# Patient Record
Sex: FEMALE | Race: OTHER | Employment: UNEMPLOYED | URBAN - METROPOLITAN AREA
[De-identification: names, ages, dates, MRNs, and addresses within clinical notes are randomized per-mention and may not be internally consistent; named-entity substitution may affect disease eponyms.]

---

## 2024-10-28 ENCOUNTER — INITIAL PRENATAL (OUTPATIENT)
Dept: OBGYN CLINIC | Age: 39
End: 2024-10-28

## 2024-10-28 ENCOUNTER — ROUTINE PRENATAL (OUTPATIENT)
Dept: OBGYN CLINIC | Age: 39
End: 2024-10-28
Payer: MEDICAID

## 2024-10-28 ENCOUNTER — PROCEDURE VISIT (OUTPATIENT)
Dept: OBGYN CLINIC | Age: 39
End: 2024-10-28
Payer: MEDICAID

## 2024-10-28 VITALS — SYSTOLIC BLOOD PRESSURE: 108 MMHG | DIASTOLIC BLOOD PRESSURE: 62 MMHG

## 2024-10-28 VITALS — WEIGHT: 161 LBS | HEIGHT: 67 IN | BODY MASS INDEX: 25.27 KG/M2

## 2024-10-28 DIAGNOSIS — O09.521 MULTIGRAVIDA OF ADVANCED MATERNAL AGE IN FIRST TRIMESTER: ICD-10-CM

## 2024-10-28 DIAGNOSIS — O09.819 TWIN PREGNANCY RESULTING FROM ASSISTED REPRODUCTIVE TECHNOLOGY (ART): ICD-10-CM

## 2024-10-28 DIAGNOSIS — Z11.3 SCREENING FOR STDS (SEXUALLY TRANSMITTED DISEASES): ICD-10-CM

## 2024-10-28 DIAGNOSIS — O36.80X0 ENCOUNTER TO DETERMINE FETAL VIABILITY OF PREGNANCY, SINGLE OR UNSPECIFIED FETUS: ICD-10-CM

## 2024-10-28 DIAGNOSIS — O09.819 TWIN PREGNANCY RESULTING FROM ASSISTED REPRODUCTIVE TECHNOLOGY (ART): Primary | ICD-10-CM

## 2024-10-28 DIAGNOSIS — Z13.89 SCREENING FOR GENITOURINARY CONDITION: Primary | ICD-10-CM

## 2024-10-28 DIAGNOSIS — O30.009 TWIN PREGNANCY RESULTING FROM ASSISTED REPRODUCTIVE TECHNOLOGY (ART): ICD-10-CM

## 2024-10-28 DIAGNOSIS — O30.009 TWIN PREGNANCY RESULTING FROM ASSISTED REPRODUCTIVE TECHNOLOGY (ART): Primary | ICD-10-CM

## 2024-10-28 DIAGNOSIS — Z3A.12 12 WEEKS GESTATION OF PREGNANCY: Primary | ICD-10-CM

## 2024-10-28 DIAGNOSIS — O30.032 MONOCHORIONIC DIAMNIOTIC TWIN GESTATION IN SECOND TRIMESTER: ICD-10-CM

## 2024-10-28 PROBLEM — Z31.89 ENCOUNTER FOR FERTILITY PLANNING: Status: RESOLVED | Noted: 2023-02-27 | Resolved: 2024-10-28

## 2024-10-28 PROBLEM — O00.102 HEMOPERITONEUM DUE TO RUPTURE OF LEFT TUBAL ECTOPIC PREGNANCY: Status: RESOLVED | Noted: 2023-08-06 | Resolved: 2024-10-28

## 2024-10-28 PROBLEM — O09.529 AMA (ADVANCED MATERNAL AGE) MULTIGRAVIDA 35+: Status: ACTIVE | Noted: 2024-10-28

## 2024-10-28 PROBLEM — N76.0 ACUTE VAGINITIS: Status: RESOLVED | Noted: 2024-01-22 | Resolved: 2024-10-28

## 2024-10-28 PROBLEM — R55 SYNCOPE AND COLLAPSE: Status: RESOLVED | Noted: 2023-08-06 | Resolved: 2024-10-28

## 2024-10-28 PROBLEM — K66.1 HEMOPERITONEUM DUE TO RUPTURE OF LEFT TUBAL ECTOPIC PREGNANCY: Status: RESOLVED | Noted: 2023-08-06 | Resolved: 2024-10-28

## 2024-10-28 LAB
ABO + RH BLD: NORMAL
BASOPHILS # BLD: 0 K/UL (ref 0–0.2)
BASOPHILS NFR BLD: 0 % (ref 0–2)
BLOOD GROUP ANTIBODIES SERPL: NORMAL
DIFFERENTIAL METHOD BLD: ABNORMAL
EOSINOPHIL # BLD: 0 K/UL (ref 0–0.8)
EOSINOPHIL NFR BLD: 0 % (ref 0.5–7.8)
ERYTHROCYTE [DISTWIDTH] IN BLOOD BY AUTOMATED COUNT: 12.3 % (ref 11.9–14.6)
HCT VFR BLD AUTO: 35.2 % (ref 35.8–46.3)
HGB BLD-MCNC: 11.8 G/DL (ref 11.7–15.4)
HIV 1+2 AB+HIV1 P24 AG SERPL QL IA: NONREACTIVE
HIV 1/2 RESULT COMMENT: NORMAL
IMM GRANULOCYTES # BLD AUTO: 0.1 K/UL (ref 0–0.5)
IMM GRANULOCYTES NFR BLD AUTO: 0 % (ref 0–5)
LYMPHOCYTES # BLD: 2.8 K/UL (ref 0.5–4.6)
LYMPHOCYTES NFR BLD: 23 % (ref 13–44)
MCH RBC QN AUTO: 34.5 PG (ref 26.1–32.9)
MCHC RBC AUTO-ENTMCNC: 33.5 G/DL (ref 31.4–35)
MCV RBC AUTO: 102.9 FL (ref 82–102)
MONOCYTES # BLD: 0.5 K/UL (ref 0.1–1.3)
MONOCYTES NFR BLD: 4 % (ref 4–12)
NEUTS SEG # BLD: 8.8 K/UL (ref 1.7–8.2)
NEUTS SEG NFR BLD: 73 % (ref 43–78)
NRBC # BLD: 0 K/UL (ref 0–0.2)
PLATELET # BLD AUTO: 281 K/UL (ref 150–450)
PMV BLD AUTO: 11 FL (ref 9.4–12.3)
RBC # BLD AUTO: 3.42 M/UL (ref 4.05–5.2)
RUBV IGG SERPL IA-ACNC: >500 IU/ML
T PALLIDUM AB SER QL IA: NONREACTIVE
WBC # BLD AUTO: 12.2 K/UL (ref 4.3–11.1)

## 2024-10-28 PROCEDURE — 76801 OB US < 14 WKS SINGLE FETUS: CPT | Performed by: OBSTETRICS & GYNECOLOGY

## 2024-10-28 PROCEDURE — 99214 OFFICE O/P EST MOD 30 MIN: CPT | Performed by: OBSTETRICS & GYNECOLOGY

## 2024-10-28 RX ORDER — FOLIC ACID 1 MG/1
1 TABLET ORAL DAILY
COMMUNITY

## 2024-10-28 RX ORDER — ASCORBIC ACID 500 MG
500 TABLET ORAL DAILY
COMMUNITY

## 2024-10-28 RX ORDER — ASPIRIN 81 MG/1
81 TABLET ORAL DAILY
COMMUNITY

## 2024-10-28 NOTE — PROGRESS NOTES
Cigarettes    Smokeless tobacco: Never   Vaping Use    Vaping status: Never Used   Substance and Sexual Activity    Alcohol use: Not Currently    Drug use: Not Currently    Sexual activity: Yes     Partners: Male     Birth control/protection: Surgical     Comment: tubal ligation   Other Topics Concern    Not on file   Social History Narrative    Not on file     Social Determinants of Health     Financial Resource Strain: Not on file   Food Insecurity: Not on file   Transportation Needs: Not on file   Physical Activity: Not on file   Stress: Not on file   Social Connections: Unknown (4/3/2022)    Received from [employer]    Social Connections     Frequency of Communication with Friends and Family: Not asked     Frequency of Social Gatherings with Friends and Family: Not asked   Intimate Partner Violence: Unknown (4/3/2022)    Received from [employer]    Intimate Partner Violence     Fear of Current or Ex-Partner: Not asked     Emotionally Abused: Not asked     Physically Abused: Not asked     Sexually Abused: Not asked   Housing Stability: Not At Risk (4/3/2022)    Received from [employer]    Housing Stability     Was there a time when you did not have a steady place to sleep: Not asked     Worried that the place you are staying is making you sick: Not asked       Family History:  Family History   Problem Relation Age of Onset    Hypertension Father     Hypertension Mother        Ultrasound results: normal with MONO/DI TWINS from single embryo transfer.  VFI x 2    Review of Systems   Constitutional: Negative.   HENT: Negative.   Eyes: Negative.   Respiratory: Negative.   Cardiovascular: Negative.   Gastrointestinal: negative  Genitourinary: Negative.   Musculoskeletal: Negative.   Skin: Negative.   Neurological: Negative.   Endo/Heme/Allergies: Negative.   Psychiatric/Behavioral: Negative.      /62   LMP 07/01/2024     Physical Exam:  Patient is a 39

## 2024-10-28 NOTE — PROGRESS NOTES
Kim HoffmannG6P3 presents today for nob talk.  EDC 25 based off of PREG.  Patient is currently 12 days 3 weeks pregnant mono/di twin pregnancy.  Had IVF with PREG.    Patient education was discussed in depth and OB book reviewed with patient.  [facility] policies reviewed with patient.    Genetic testing discussed in depth with patient.  Patient elects patient had embryo tested through PREG.    Past Medical History:AMA.  Hx of ectopic.  Hx of 1 SAB.  Patient states she had 3 normal pregnancies and deliveries.  Vaginal deliveries in the [place] x 3.     Patient c/o:none    Patient to see Dr. Ying today for nob exam.    All questions answered patient voiced full understanding, consents signed.  Patient encouraged to call with questions or concerns.   service used for this visit.

## 2024-10-29 LAB
C TRACH RRNA SPEC QL NAA+PROBE: NEGATIVE
HBV SURFACE AG SERPL QL IA: NEGATIVE
HCV AB SERPL QL IA: NORMAL
HCV IGG SERPL QL IA: NON REACTIVE S/CO RATIO
N GONORRHOEA RRNA SPEC QL NAA+PROBE: NEGATIVE
SPECIMEN SOURCE: NORMAL
T VAGINALIS RRNA SPEC QL NAA+PROBE: NEGATIVE
VZV IGG SER IA-ACNC: REACTIVE

## 2024-10-30 LAB
BACTERIA SPEC CULT: NORMAL
SERVICE CMNT-IMP: NORMAL

## 2024-10-31 PROBLEM — D58.2 HIGH BLOOD HEMOGLOBIN F (HCC): Status: ACTIVE | Noted: 2024-10-31

## 2024-10-31 LAB — HGB FRACT BLD ELPH-IMP: NORMAL

## 2024-11-12 ENCOUNTER — HOSPITAL ENCOUNTER (EMERGENCY)
Age: 39
Discharge: HOME | End: 2024-11-12
Attending: EMERGENCY MEDICINE
Payer: MEDICAID

## 2024-11-12 VITALS
OXYGEN SATURATION: 100 % | HEART RATE: 80 BPM | TEMPERATURE: 98.2 F | WEIGHT: 158 LBS | HEIGHT: 67 IN | SYSTOLIC BLOOD PRESSURE: 114 MMHG | BODY MASS INDEX: 24.8 KG/M2 | RESPIRATION RATE: 18 BRPM | DIASTOLIC BLOOD PRESSURE: 73 MMHG

## 2024-11-12 DIAGNOSIS — R51.9 ACUTE NONINTRACTABLE HEADACHE, UNSPECIFIED HEADACHE TYPE: Primary | ICD-10-CM

## 2024-11-12 DIAGNOSIS — K13.79 MOUTH PAIN: ICD-10-CM

## 2024-11-12 DIAGNOSIS — Z3A.14 14 WEEKS GESTATION OF PREGNANCY: ICD-10-CM

## 2024-11-12 PROCEDURE — 6370000000 HC RX 637 (ALT 250 FOR IP): Performed by: EMERGENCY MEDICINE

## 2024-11-12 PROCEDURE — 99283 EMERGENCY DEPT VISIT LOW MDM: CPT

## 2024-11-12 RX ORDER — ACETAMINOPHEN 500 MG
1000 TABLET ORAL
Status: COMPLETED | OUTPATIENT
Start: 2024-11-12 | End: 2024-11-12

## 2024-11-12 RX ADMIN — ACETAMINOPHEN 1000 MG: 500 TABLET, FILM COATED ORAL at 18:38

## 2024-11-12 ASSESSMENT — ENCOUNTER SYMPTOMS
NAUSEA: 0
DIARRHEA: 0
EYE PAIN: 0
CONSTIPATION: 0
VOMITING: 0
COUGH: 0
ABDOMINAL PAIN: 0
BACK PAIN: 0
SHORTNESS OF BREATH: 0
RHINORRHEA: 0
BLURRED VISION: 0
COLOR CHANGE: 0
SORE THROAT: 0

## 2024-11-12 ASSESSMENT — PAIN - FUNCTIONAL ASSESSMENT: PAIN_FUNCTIONAL_ASSESSMENT: 0-10

## 2024-11-12 ASSESSMENT — PAIN SCALES - GENERAL
PAINLEVEL_OUTOF10: 8
PAINLEVEL_OUTOF10: 8

## 2024-11-12 ASSESSMENT — PAIN DESCRIPTION - LOCATION: LOCATION: HEAD;MOUTH

## 2024-11-12 NOTE — ED PROVIDER NOTES
Emergency Department Provider Note       PCP: None, None   Age: 39 y.o.   Sex: female     DISPOSITION Decision To Discharge 11/12/2024 06:14:01 PM    ICD-10-CM    1. Acute nonintractable headache, unspecified headache type  R51.9       2. Mouth pain  K13.79       3. 14 weeks gestation of pregnancy  Z3A.14           Medical Decision Making     Patient with mouth pain and discomfort along with diffuse headache.  No blurry vision.  No nausea or vomiting.  She is taking 200 mg of Tylenol at home.  Told her to confirm it is acetaminophen and she can take 1000 mg for the headache.  Follow-up with OB.  No vaginal bleed or discharge.  Continue prenatal vitamins.     1 or more acute illnesses that pose a threat to life or bodily function.   Over the counter drug management performed.  Patient was discharged risks and benefits of hospitalization were considered.  Shared medical decision making was utilized in creating the patients health plan today.  I independently ordered and reviewed each unique test.                         History     Patient reports 6 days of roof of the mouth pain and posterior teeth discomfort.  Feels slightly swollen.  She also has 4 days of posterior headache and diffuse headache all over.  No nausea or vomiting.  She is 14 weeks pregnant.  Doing well.  No vaginal bleed or discharge.  Followed by OB.  Here for evaluation.  States she is taking 200 mg of Tylenol at home.  Confirmed this is not Motrin ibuprofen or Advil.    The history is provided by the patient. A  was used.   Headache  Pain location:  Generalized  Quality:  Dull  Radiates to:  Does not radiate  Duration:  4 days  Timing:  Constant  Progression:  Waxing and waning  Chronicity:  New  Relieved by:  Nothing  Worsened by:  Nothing  Associated symptoms: no abdominal pain, no back pain, no blurred vision, no cough, no diarrhea, no dizziness, no ear pain, no eye pain, no fatigue, no fever, no nausea, no neck pain, no

## 2024-11-12 NOTE — ED NOTES
Patient mobility status  with no difficulty.     I have reviewed discharge instructions with the patient.  The patient verbalized understanding.    Patient left ED via Discharge Method: ambulatory to Home with Extended Family:.    Opportunity for questions and clarification provided.     Patient given 0 scripts.

## 2024-11-12 NOTE — ED TRIAGE NOTES
Pt reports 14 weeks pregnant. . Pt reports having pain in top of mouth also reports feels swollen x 6 days. Pt having posterior headache taking tylenol without improvement. Pt followed by Ancelmo PINEDA.

## 2024-11-25 ENCOUNTER — ROUTINE PRENATAL (OUTPATIENT)
Dept: OBGYN CLINIC | Age: 39
End: 2024-11-25
Payer: MEDICAID

## 2024-11-25 ENCOUNTER — PROCEDURE VISIT (OUTPATIENT)
Dept: OBGYN CLINIC | Age: 39
End: 2024-11-25
Payer: MEDICAID

## 2024-11-25 VITALS — DIASTOLIC BLOOD PRESSURE: 62 MMHG | WEIGHT: 164 LBS | SYSTOLIC BLOOD PRESSURE: 96 MMHG | BODY MASS INDEX: 25.69 KG/M2

## 2024-11-25 DIAGNOSIS — O30.032 MONOCHORIONIC DIAMNIOTIC TWIN GESTATION IN SECOND TRIMESTER: ICD-10-CM

## 2024-11-25 DIAGNOSIS — O30.009 TWIN PREGNANCY RESULTING FROM ASSISTED REPRODUCTIVE TECHNOLOGY (ART): ICD-10-CM

## 2024-11-25 DIAGNOSIS — O09.819 TWIN PREGNANCY RESULTING FROM ASSISTED REPRODUCTIVE TECHNOLOGY (ART): ICD-10-CM

## 2024-11-25 DIAGNOSIS — Z13.89 SCREENING FOR GENITOURINARY CONDITION: ICD-10-CM

## 2024-11-25 DIAGNOSIS — O09.522 MULTIGRAVIDA OF ADVANCED MATERNAL AGE IN SECOND TRIMESTER: Primary | ICD-10-CM

## 2024-11-25 DIAGNOSIS — Z3A.16 16 WEEKS GESTATION OF PREGNANCY: ICD-10-CM

## 2024-11-25 DIAGNOSIS — N89.8 VAGINAL DISCHARGE: ICD-10-CM

## 2024-11-25 DIAGNOSIS — Z11.3 SCREENING EXAMINATION FOR VENEREAL DISEASE: ICD-10-CM

## 2024-11-25 DIAGNOSIS — O30.032 MONOCHORIONIC DIAMNIOTIC TWIN GESTATION IN SECOND TRIMESTER: Primary | ICD-10-CM

## 2024-11-25 LAB
GLUCOSE URINE, POC: NEGATIVE
PROTEIN,URINE, POC: NORMAL

## 2024-11-25 PROCEDURE — 81002 URINALYSIS NONAUTO W/O SCOPE: CPT | Performed by: NURSE PRACTITIONER

## 2024-11-25 PROCEDURE — 76805 OB US >/= 14 WKS SNGL FETUS: CPT | Performed by: STUDENT IN AN ORGANIZED HEALTH CARE EDUCATION/TRAINING PROGRAM

## 2024-11-25 PROCEDURE — 76810 OB US >/= 14 WKS ADDL FETUS: CPT | Performed by: STUDENT IN AN ORGANIZED HEALTH CARE EDUCATION/TRAINING PROGRAM

## 2024-11-25 PROCEDURE — 99213 OFFICE O/P EST LOW 20 MIN: CPT | Performed by: NURSE PRACTITIONER

## 2024-11-25 NOTE — PROGRESS NOTES
SEEN WITH TV . Doing well. FHT for mono-di twins. C/o milky white discharge. Denies odor. Denies itching. Denies burning/frequency with urination. Denies pelvic/abd/flank pain .Denies fevers. Denies new partners. Will send nuswab for further eval, but reassured discharge can increase as pregnancy progresses. MFM on Wednesday for early anatomy. RTC 4 weeks.

## 2024-11-27 ENCOUNTER — ROUTINE PRENATAL (OUTPATIENT)
Dept: OBGYN CLINIC | Age: 39
End: 2024-11-27
Payer: MEDICAID

## 2024-11-27 VITALS — DIASTOLIC BLOOD PRESSURE: 72 MMHG | SYSTOLIC BLOOD PRESSURE: 104 MMHG

## 2024-11-27 DIAGNOSIS — O09.812 HIGH RISK PREGNANCY DUE TO ASSISTED REPRODUCTIVE TECHNOLOGY IN SECOND TRIMESTER: ICD-10-CM

## 2024-11-27 DIAGNOSIS — O09.819 TWIN PREGNANCY RESULTING FROM ASSISTED REPRODUCTIVE TECHNOLOGY (ART): ICD-10-CM

## 2024-11-27 DIAGNOSIS — O09.522 MULTIGRAVIDA OF ADVANCED MATERNAL AGE IN SECOND TRIMESTER: Primary | ICD-10-CM

## 2024-11-27 DIAGNOSIS — Z3A.16 16 WEEKS GESTATION OF PREGNANCY: ICD-10-CM

## 2024-11-27 DIAGNOSIS — O30.009 TWIN PREGNANCY RESULTING FROM ASSISTED REPRODUCTIVE TECHNOLOGY (ART): ICD-10-CM

## 2024-11-27 DIAGNOSIS — O30.032 MONOCHORIONIC DIAMNIOTIC TWIN GESTATION IN SECOND TRIMESTER: ICD-10-CM

## 2024-11-27 PROCEDURE — 76805 OB US >/= 14 WKS SNGL FETUS: CPT | Performed by: OBSTETRICS & GYNECOLOGY

## 2024-11-27 PROCEDURE — 76810 OB US >/= 14 WKS ADDL FETUS: CPT | Performed by: OBSTETRICS & GYNECOLOGY

## 2024-11-27 PROCEDURE — 99204 OFFICE O/P NEW MOD 45 MIN: CPT | Performed by: OBSTETRICS & GYNECOLOGY

## 2024-11-27 RX ORDER — ACETAMINOPHEN 500 MG
500 TABLET ORAL EVERY 6 HOURS PRN
COMMUNITY

## 2024-11-27 ASSESSMENT — PATIENT HEALTH QUESTIONNAIRE - PHQ9
2. FEELING DOWN, DEPRESSED OR HOPELESS: NOT AT ALL
SUM OF ALL RESPONSES TO PHQ QUESTIONS 1-9: 0
1. LITTLE INTEREST OR PLEASURE IN DOING THINGS: NOT AT ALL
SUM OF ALL RESPONSES TO PHQ QUESTIONS 1-9: 0
SUM OF ALL RESPONSES TO PHQ QUESTIONS 1-9: 0
SUM OF ALL RESPONSES TO PHQ9 QUESTIONS 1 & 2: 0
SUM OF ALL RESPONSES TO PHQ QUESTIONS 1-9: 0

## 2024-11-27 NOTE — ASSESSMENT & PLAN NOTE
Advanced Maternal Age (Maternal Age 35 or greater at XMIENA)  First and Second Trimester  The risk of fetal aneuploidy based on maternal age should be reviewed with patient either with physicians or genetic counselor, or both. Testing for fetal aneuploidy can be divided into invasive and non-invasive tests.   Invasive testing, which includes amniocentesis and chorionic villus sampling, is diagnostic.   Non-invasive testing of maternal blood (for either hormone levels or cell-free fetal DNA), with or without ultrasound examination, is a screening test and requires follow-up testing of patients with screen-positive results.  Given the increased risk of congenital anomalies in older women, a detailed second trimester ultrasound examination to assess for significant structural anomalies (particularly cardiac defects) is recommended.      Genetic counseling was performed by physician after reviewing patient's genetic history.  The patient's Down syndrome age associated risk, as well as, risks of additional aneuploidy and genetic syndromes, are reduced by approximately 50% with a normal anatomy ultrasound. Ultrasound alone does not rule out all abnormalities of genetics and development.     Maternal serum screening for aneuploidy was discussed with the patient including first trimester SHEILA-A/hCG, second trimester Quad screen (either in isolation or sequential with SHEILA-A) as well as non-invasive prenatal testing (NIPT) for aneuploidy from a maternal blood sample.  Positive predictive and negative predictive values for these tests were explained, questions answered. Patient understands that these are screening tests that only assesses risk for select abnormalities (trisomies 13, 18, and 21, and sex chromosome abnormalities (NIPT), as well as markers for placental health (SHEILA-A) and risk for open neural tube defects (quad)).  NIPT is designed for high risk populations, but should be considered by all patients who desire the

## 2024-11-27 NOTE — ASSESSMENT & PLAN NOTE
Discussed risks of twin to twin transfusion syndrome/TAPS and need for close surveillance.   Counseling regarding risks of IUGR, preeclampsia.  PTL and cervical insufficiency precautions given.    Low dose Aspirin x 2 tablets (162 mg daily) is recommended to be started at 12-16 weeks; some benefit seen with starting up to 28 weeks.   Recommend serial cervical sonographic evaluation with anatomy and continuing through 24-28 weeks for concerns.   Nutritional Optimization  Encouraged intake of at least  grams of protein per day; will need more if growth lag develops.   Supplementation with PNV daily, Calcium 1000mg daily, 2000IU vitamin D3, slow release iron sulfate should be encouraged beginning by 16 weeks.   Reviewed over the counter medications for treatment of common complaints.   Likely delivery timing reviewed.

## 2024-11-27 NOTE — PATIENT INSTRUCTIONS
Your Maternity Check List   Before Arriving to the Hospital     Find an OBGYN Doctor: [email or web address]  Maternity Pre-registration for Hospital: [email or web address]  Sign up for a Hospital Tour: www.[email or web address]/about-us/classes-events  [place] for Prenatal Classes: www.[facility]UP/about-us/classes-events   Car seat Safety Check: https://www.safekids.org/coalition/safe-kids-[facility]   Learn More: www.[facility]UP/health-care-services/womens-health/maternity   Download the [employer]® Pregnancy Kesha: (Scan QR Code below):  See educational videos, track your pregnancy, and Mom/Baby Care videos

## 2024-11-27 NOTE — PROGRESS NOTES
reviewing previous notes, test results and face to face with the patient discussing the diagnosis and importance of compliance with the treatment plan, as well as documenting on the day of the visit 11/27/2024. Normal ultrasound findings are not included in this time calculation. Full ultrasound data in ultrasound report. Limited ultrasound data included in office consult note.

## 2024-11-28 LAB
A VAGINAE DNA VAG QL NAA+PROBE: ABNORMAL SCORE
BVAB2 DNA VAG QL NAA+PROBE: ABNORMAL SCORE
C ALBICANS DNA VAG QL NAA+PROBE: POSITIVE
C GLABRATA DNA VAG QL NAA+PROBE: NEGATIVE
C TRACH RRNA SPEC QL NAA+PROBE: NEGATIVE
MEGA1 DNA VAG QL NAA+PROBE: ABNORMAL SCORE
N GONORRHOEA RRNA SPEC QL NAA+PROBE: NEGATIVE
SPECIMEN SOURCE: ABNORMAL
T VAGINALIS RRNA SPEC QL NAA+PROBE: NEGATIVE

## 2024-11-29 ENCOUNTER — HOSPITAL ENCOUNTER (OUTPATIENT)
Age: 39
LOS: 1 days | Discharge: HOME | End: 2024-11-29
Attending: OBSTETRICS & GYNECOLOGY | Admitting: OBSTETRICS & GYNECOLOGY
Payer: MEDICAID

## 2024-11-29 VITALS
HEIGHT: 67 IN | WEIGHT: 160 LBS | SYSTOLIC BLOOD PRESSURE: 104 MMHG | HEART RATE: 69 BPM | DIASTOLIC BLOOD PRESSURE: 66 MMHG | BODY MASS INDEX: 25.11 KG/M2 | OXYGEN SATURATION: 99 % | TEMPERATURE: 98.1 F | RESPIRATION RATE: 16 BRPM

## 2024-11-29 PROBLEM — O26.852: Status: ACTIVE | Noted: 2024-11-29

## 2024-11-29 PROCEDURE — 4500000002 HC ER NO CHARGE

## 2024-11-29 PROCEDURE — 99283 EMERGENCY DEPT VISIT LOW MDM: CPT

## 2024-11-29 ASSESSMENT — PAIN - FUNCTIONAL ASSESSMENT: PAIN_FUNCTIONAL_ASSESSMENT: NONE - DENIES PAIN

## 2024-11-30 NOTE — H&P
Obstetrics History & Physical/OB ED note    Name: Kim Hoffmann MRN: [id number]     YOB: 1985  Age: 39 y.o.  Sex: female      Reason for Presentation:  vaginal spotting    HPI: Kim Hoffmann is a 39 y.o.  female with Estimated Date of Delivery: 25 at 17w0d gestation. Her obstetrical history is significant for  3 previous full-term vaginal deliveries.  Prenatal records reviewed. This pregnancy was conceived by IVF and is monochorionic/diamniotic twins.    She is here with complaint of vaginal spotting today. She has vaginal discharge baseline that she has been told is normal. Today she noticed pink color in the discharge with wiping with urination 3 times so far today so came in for evaluation. This is not associated with any pain. No recent intercourse. No problems with urination.    She reports good fetal movement.  She denies leakage of fluid. She denies contractions.     Past History:  OB History    Para Term  AB Living   7 3 3   3 3   SAB IAB Ectopic Molar Multiple Live Births   2 0 1     3      # Outcome Date GA Lbr Roland/2nd Weight Sex Type Anes PTL Lv   7 Current            6 Ectopic 2023           5 Term 09/20/10 40w0d  3.629 kg (8 lb) F Vag-Spont None  CHEL   4 Term 10/09/06 40w0d  3.402 kg (7 lb 8 oz) M Vag-Spont None  CHEL   3 Term 05 39w0d  3.175 kg (7 lb) F Vag-Spont None  CHEL   2 SAB            1 SAB              Past Medical History:   Diagnosis Date    Encounter for assisted reproductive fertility cycle     Infertility, female     male factor-IVF     Past Surgical History:   Procedure Laterality Date    ABDOMINOPLASTY      BREAST REDUCTION SURGERY      LAPAROSCOPY N/A 2023    LAPAROSCOPY DIAGNOSTIC, REMOVAL OF ECTOPIC PREGNANCY, LEFT SALPINGECTOMY performed by Palmer Mcguire MD at [facility] MAIN OR    TUBAL LIGATION       Social History     Tobacco Use    Smoking status: Former     Current packs/day: 0.20     Types: Cigarettes    Smokeless tobacco: Never    Substance Use Topics    Alcohol use: Not Currently     Prior to Admission medications    Medication Sig Start Date End Date Taking? Authorizing Provider   acetaminophen (TYLENOL) 500 MG tablet Take 1 tablet by mouth every 6 hours as needed for Pain   Yes Sen Ventura MD   Prenatal MV-Min-Fe Fum-FA-DHA (PRENATAL+DHA PO) Take by mouth   Yes Sen Ventura MD   vitamin C (ASCORBIC ACID) 500 MG tablet Take 1 tablet by mouth daily   Yes Sen Ventura MD   aspirin 81 MG EC tablet Take 1 tablet by mouth daily   Yes Sen Ventura MD   folic acid (FOLVITE) 1 MG tablet Take 1 tablet by mouth daily   Yes Sen Ventura MD     No Known Allergies    Physical Exam:  VITALS:  /66   Pulse 69   Temp 98.1 °F (36.7 °C) (Oral)   Resp 16   Ht 1.702 m (5' 7\")   Wt 72.6 kg (160 lb)   LMP 07/01/2024   SpO2 99%   BMI 25.06 kg/m²     CONSTITUTIONAL:  awake, alert, cooperative, no apparent distress, and appears stated age  ABDOMEN:  non-tender  FHTs:  Appropriate FHTs x2 on bedside ultrasound ;  Uterine activity/Contractions on monitor: None  Membranes: intact  Cervix: closed/thick/high  Presentation: breech x2 on bedside ultrasound  No visible placental abruption on bedside ultrasound      Assessment:  17w0d gestation with spotting in normal discharge  No evidence of labor or abruption ;   Reassuring fetal status    Plan: Discharge home, follow-up at next OB appointment, which is on 12/11/24  Advised her to follow up this coming week if bleeding became heavier

## 2024-11-30 NOTE — ED TRIAGE NOTES
Patient presents to the ER for vaginal bleeding that started today at around 1800. Patient is 17 weeks pregnant with a confirmed pregnancy by ultrasound. Patient has had light red bleeding.

## 2024-12-11 ENCOUNTER — ROUTINE PRENATAL (OUTPATIENT)
Dept: OBGYN CLINIC | Age: 39
End: 2024-12-11
Payer: MEDICAID

## 2024-12-11 VITALS — DIASTOLIC BLOOD PRESSURE: 62 MMHG | SYSTOLIC BLOOD PRESSURE: 95 MMHG

## 2024-12-11 DIAGNOSIS — O30.032 MONOCHORIONIC DIAMNIOTIC TWIN GESTATION IN SECOND TRIMESTER: ICD-10-CM

## 2024-12-11 DIAGNOSIS — O09.522 MULTIGRAVIDA OF ADVANCED MATERNAL AGE IN SECOND TRIMESTER: ICD-10-CM

## 2024-12-11 DIAGNOSIS — Z3A.18 18 WEEKS GESTATION OF PREGNANCY: ICD-10-CM

## 2024-12-11 DIAGNOSIS — O09.812 HIGH RISK PREGNANCY DUE TO ASSISTED REPRODUCTIVE TECHNOLOGY IN SECOND TRIMESTER: Primary | ICD-10-CM

## 2024-12-11 PROBLEM — O26.852: Status: RESOLVED | Noted: 2024-11-29 | Resolved: 2024-12-11

## 2024-12-11 PROCEDURE — 99214 OFFICE O/P EST MOD 30 MIN: CPT | Performed by: OBSTETRICS & GYNECOLOGY

## 2024-12-11 PROCEDURE — 76816 OB US FOLLOW-UP PER FETUS: CPT | Performed by: OBSTETRICS & GYNECOLOGY

## 2024-12-11 PROCEDURE — 76821 MIDDLE CEREBRAL ARTERY ECHO: CPT | Performed by: OBSTETRICS & GYNECOLOGY

## 2024-12-11 PROCEDURE — 76820 UMBILICAL ARTERY ECHO: CPT | Performed by: OBSTETRICS & GYNECOLOGY

## 2024-12-11 ASSESSMENT — PATIENT HEALTH QUESTIONNAIRE - PHQ9
2. FEELING DOWN, DEPRESSED OR HOPELESS: NOT AT ALL
SUM OF ALL RESPONSES TO PHQ QUESTIONS 1-9: 0
SUM OF ALL RESPONSES TO PHQ9 QUESTIONS 1 & 2: 0
SUM OF ALL RESPONSES TO PHQ QUESTIONS 1-9: 0
SUM OF ALL RESPONSES TO PHQ QUESTIONS 1-9: 0
1. LITTLE INTEREST OR PLEASURE IN DOING THINGS: NOT AT ALL
SUM OF ALL RESPONSES TO PHQ QUESTIONS 1-9: 0

## 2024-12-11 NOTE — PROGRESS NOTES
ASSESSMENT/PLAN:    Patient Active Problem List    Diagnosis Date Noted    Monochorionic diamniotic twin gestation in second trimester 11/27/2024     Priority: High     Overview Note:     11/27/24 [facility]: MonoDi twinsMs. Normal early anatomy x2. No signs of TTTS. Embryo had PGT testing. Genetic counseling done by MD.  For full details review formal ultrasound report.   12/11/24 [facility]: Reassuring fetal status x 2. Twin A: AC- 76%, MVP- 4.3 cm. Twin B: AC- 91%, MVP- 3.8 cm. CL- 4.5 cm. No sign of TTTS. For full details review formal ultrasound report.          Assessment & Plan Note:     F/U [facility] 2 weeks      Multigravida of advanced maternal age in second trimester 10/28/2024     Priority: High     Overview Note:     Referral to [facility] placed 10/28/24.  Genetics: PGT-A normal; no NIPT  GTT:  Flu:  Tdap:      High risk pregnancy due to assisted reproductive technology in second trimester 10/28/2024     Priority: Medium     Overview Note:     PREG patient due to male factor.  Single embryo transferred; frozen. PGT-A normal; declined NIPT  Di/Rhea  Referral to [facility].  PREG patient.  HX BTL.      High blood hemoglobin F (HCC) 10/31/2024     Overview Note:     Not clinically significant          Alida Wheatley MD   An electronic signature was used to authenticate this note.  Return in about 2 weeks (around 12/25/2024) for TTTS.     I have spent 31 minutes reviewing previous notes, test results and face to face with the patient discussing the diagnosis and importance of compliance with the treatment plan, as well as documenting on the day of the visit 12/11/2024. Normal ultrasound findings are not included in this time calculation. Full ultrasound data in ultrasound report. Limited ultrasound data included in office consult note.

## 2024-12-11 NOTE — PATIENT INSTRUCTIONS
Your Maternity Check List   Before Arriving to the Hospital     Find an OBGYN Doctor: [email or web address]  Maternity Pre-registration for Hospital: [email or web address]  Sign up for a Hospital Tour: www.komoot/about-us/classes-events  [place] for Prenatal Classes: www.komoot/about-us/classes-events   Car seat Safety Check: https://www.[email or web address]/coalition/safe-kids-[facility]   Learn More: www.komoot/health-care-services/womens-health/maternity   Download the [employer] Pregnancy Kesha: (Scan QR Code below):  See educational videos, track your pregnancy, and Mom/Baby Care videos          Resources for Depression/Anxiety  Postpartum Support International (PSI).    PSI Warmline:  5-546-818-4PPD (3836).  WWW.POSTPARTUM.NET    Mom's IMPACTT  [email or web address]://[email or web address].org/medical-services/womens/reproductive-behavioral-health/moms-impactt       In order to optimize maternal, fetal, and  health, we recommend the following vaccinations.   Flu- yearly (https://www.highriskpregnancyinfo.org/flu-facts-for-pregnancy)  Consider Covid vaccination/booster (https://www.highriskpregnancyinfo.org/covid-19-pregnancy)  TDaP after 28 weeks each pregnancy (https://www.highriskpregnancyinfo.org/tdap)  Consider RSV vaccine 32-36 weeks of pregnancy, if Sept- January.  (https://www.highriskpregnancyinfo.org/rsv)

## 2024-12-20 ENCOUNTER — ROUTINE PRENATAL (OUTPATIENT)
Dept: OBGYN CLINIC | Age: 39
End: 2024-12-20

## 2024-12-20 VITALS — BODY MASS INDEX: 26.83 KG/M2 | DIASTOLIC BLOOD PRESSURE: 76 MMHG | SYSTOLIC BLOOD PRESSURE: 118 MMHG | WEIGHT: 171.3 LBS

## 2024-12-20 DIAGNOSIS — Z3A.20 20 WEEKS GESTATION OF PREGNANCY: ICD-10-CM

## 2024-12-20 DIAGNOSIS — Z13.89 SCREENING FOR GENITOURINARY CONDITION: ICD-10-CM

## 2024-12-20 DIAGNOSIS — Z34.92 PRENATAL CARE, SECOND TRIMESTER: Primary | ICD-10-CM

## 2024-12-20 LAB
GLUCOSE URINE, POC: NEGATIVE
GLUCOSE URINE, POC: NEGATIVE
PROTEIN,URINE, POC: NORMAL
PROTEIN,URINE, POC: NORMAL

## 2024-12-20 NOTE — PROGRESS NOTES
Seeing mfm .  Ptl precautions. Rtc 4 weeks.  Taking asa/ vit c /pnv.  Added vit d.  Discussed movement.  Will be . Hx of abdominoplasty.

## 2024-12-24 ENCOUNTER — ROUTINE PRENATAL (OUTPATIENT)
Dept: OBGYN CLINIC | Age: 39
End: 2024-12-24
Payer: MEDICAID

## 2024-12-24 VITALS — SYSTOLIC BLOOD PRESSURE: 109 MMHG | DIASTOLIC BLOOD PRESSURE: 63 MMHG

## 2024-12-24 DIAGNOSIS — D58.2 HIGH BLOOD HEMOGLOBIN F (HCC): ICD-10-CM

## 2024-12-24 DIAGNOSIS — Z3A.20 20 WEEKS GESTATION OF PREGNANCY: ICD-10-CM

## 2024-12-24 DIAGNOSIS — O43.192 MARGINAL INSERTION OF UMBILICAL CORD AFFECTING MANAGEMENT OF MOTHER IN SECOND TRIMESTER: ICD-10-CM

## 2024-12-24 DIAGNOSIS — O30.032 MONOCHORIONIC DIAMNIOTIC TWIN GESTATION IN SECOND TRIMESTER: ICD-10-CM

## 2024-12-24 DIAGNOSIS — O09.812 HIGH RISK PREGNANCY DUE TO ASSISTED REPRODUCTIVE TECHNOLOGY IN SECOND TRIMESTER: ICD-10-CM

## 2024-12-24 DIAGNOSIS — O09.522 MULTIGRAVIDA OF ADVANCED MATERNAL AGE IN SECOND TRIMESTER: Primary | ICD-10-CM

## 2024-12-24 PROCEDURE — 76812 OB US DETAILED ADDL FETUS: CPT | Performed by: OBSTETRICS & GYNECOLOGY

## 2024-12-24 PROCEDURE — 76811 OB US DETAILED SNGL FETUS: CPT | Performed by: OBSTETRICS & GYNECOLOGY

## 2024-12-24 PROCEDURE — 99214 OFFICE O/P EST MOD 30 MIN: CPT | Performed by: OBSTETRICS & GYNECOLOGY

## 2024-12-24 PROCEDURE — 93325 DOPPLER ECHO COLOR FLOW MAPG: CPT | Performed by: OBSTETRICS & GYNECOLOGY

## 2024-12-24 PROCEDURE — 76827 ECHO EXAM OF FETAL HEART: CPT | Performed by: OBSTETRICS & GYNECOLOGY

## 2024-12-24 PROCEDURE — 76825 ECHO EXAM OF FETAL HEART: CPT | Performed by: OBSTETRICS & GYNECOLOGY

## 2024-12-24 ASSESSMENT — PATIENT HEALTH QUESTIONNAIRE - PHQ9
SUM OF ALL RESPONSES TO PHQ9 QUESTIONS 1 & 2: 0
SUM OF ALL RESPONSES TO PHQ QUESTIONS 1-9: 0
SUM OF ALL RESPONSES TO PHQ QUESTIONS 1-9: 0
1. LITTLE INTEREST OR PLEASURE IN DOING THINGS: NOT AT ALL
SUM OF ALL RESPONSES TO PHQ QUESTIONS 1-9: 0
SUM OF ALL RESPONSES TO PHQ QUESTIONS 1-9: 0
2. FEELING DOWN, DEPRESSED OR HOPELESS: NOT AT ALL

## 2024-12-24 NOTE — PROGRESS NOTES
day of the visit 12/24/2024. Normal ultrasound findings are not included in this time calculation. Full ultrasound data in ultrasound report. Limited ultrasound data included in office consult note.

## 2025-01-07 ENCOUNTER — ROUTINE PRENATAL (OUTPATIENT)
Dept: OBGYN CLINIC | Age: 40
End: 2025-01-07
Payer: MEDICAID

## 2025-01-07 VITALS — SYSTOLIC BLOOD PRESSURE: 105 MMHG | DIASTOLIC BLOOD PRESSURE: 71 MMHG

## 2025-01-07 DIAGNOSIS — O09.522 MULTIGRAVIDA OF ADVANCED MATERNAL AGE IN SECOND TRIMESTER: ICD-10-CM

## 2025-01-07 DIAGNOSIS — O43.192 MARGINAL INSERTION OF UMBILICAL CORD AFFECTING MANAGEMENT OF MOTHER IN SECOND TRIMESTER: ICD-10-CM

## 2025-01-07 DIAGNOSIS — O30.032 MONOCHORIONIC DIAMNIOTIC TWIN GESTATION IN SECOND TRIMESTER: Primary | ICD-10-CM

## 2025-01-07 DIAGNOSIS — O09.812 HIGH RISK PREGNANCY DUE TO ASSISTED REPRODUCTIVE TECHNOLOGY IN SECOND TRIMESTER: ICD-10-CM

## 2025-01-07 DIAGNOSIS — Z3A.22 22 WEEKS GESTATION OF PREGNANCY: ICD-10-CM

## 2025-01-07 PROCEDURE — 76821 MIDDLE CEREBRAL ARTERY ECHO: CPT | Performed by: OBSTETRICS & GYNECOLOGY

## 2025-01-07 PROCEDURE — 76816 OB US FOLLOW-UP PER FETUS: CPT | Performed by: OBSTETRICS & GYNECOLOGY

## 2025-01-07 PROCEDURE — 76820 UMBILICAL ARTERY ECHO: CPT | Performed by: OBSTETRICS & GYNECOLOGY

## 2025-01-07 PROCEDURE — 99214 OFFICE O/P EST MOD 30 MIN: CPT | Performed by: OBSTETRICS & GYNECOLOGY

## 2025-01-07 RX ORDER — FAMOTIDINE 20 MG/1
20 TABLET, FILM COATED ORAL 2 TIMES DAILY
COMMUNITY

## 2025-01-07 ASSESSMENT — PATIENT HEALTH QUESTIONNAIRE - PHQ9
SUM OF ALL RESPONSES TO PHQ QUESTIONS 1-9: 0
SUM OF ALL RESPONSES TO PHQ9 QUESTIONS 1 & 2: 0
SUM OF ALL RESPONSES TO PHQ QUESTIONS 1-9: 0
2. FEELING DOWN, DEPRESSED OR HOPELESS: NOT AT ALL
SUM OF ALL RESPONSES TO PHQ QUESTIONS 1-9: 0
1. LITTLE INTEREST OR PLEASURE IN DOING THINGS: NOT AT ALL
SUM OF ALL RESPONSES TO PHQ QUESTIONS 1-9: 0

## 2025-01-07 NOTE — PATIENT INSTRUCTIONS
Your Maternity Check List   Before Arriving to the Hospital     Find an OBGYN Doctor: [email or web address]  Maternity Pre-registration for Hospital: [email or web address]  Sign up for a Hospital Tour: www.MovieLaLa/about-us/classes-events  [place] for Prenatal Classes: www.MovieLaLa/about-us/classes-events   Car seat Safety Check: https://www.myThings.org/coalition/safe-kids-[facility]   Learn More: www.[employer]LaLa/health-care-services/womens-health/maternity   Download the [employer] Pregnancy Kesha: (Scan QR Code below):  See educational videos, track your pregnancy, and Mom/Baby Care videos          Resources for Depression/Anxiety  Postpartum Support International (PSI).    PSI Warmline:  5-316-599-4PPD (8027).  WWW.POSTPARTUM.NET    Mom's IMPACTT  https://[email or web address].org/medical-services/womens/reproductive-behavioral-health/moms-impactt       In order to optimize maternal, fetal, and  health, we recommend the following vaccinations.   Flu- yearly (https://www.highriskpregnancyinfo.org/flu-facts-for-pregnancy)  Consider Covid vaccination/booster (https://www.highriskpregnancyinfo.org/covid-19-pregnancy)  TDaP after 28 weeks each pregnancy (https://www.highriskpregnancyinfo.org/tdap)  Consider RSV vaccine 32-36 weeks of pregnancy, if Sept- January.  (https://www.highriskpregnancyinfo.org/rsv)

## 2025-01-07 NOTE — PROGRESS NOTES
[facility] MATERNAL FETAL MEDICINE    [address] [place] [address]  P- [phone]  [phone]     MFM Follow-up Visit  Kim Hoffmann (1985) is a 39 y.o.  at 22w4d with 2025, by Other Basis.   Presents for evaluation of the following chief complaint(s):   Chief Complaint   Patient presents with    High Risk Pregnancy     AMA, Twins, IVF      w/ HIT Services present for entire visit.     Patient is a SAHM. Expecting 2 girls, Joy and Martita.   Patient is scheduled to see Primary OB ([facility]) on 25.  Support person/friend present today.     Interval history since prior appt reviewed and chart updated as indicated.    No HAs, edema.  Denies preeclamptic symptoms.  Reports good fetal movement.  No bleeding, LOF, cramping, ctxs, or vaginal pressure.        Mood evaluated today based on discussion with pt and PHQ screen.       2025    11:12 AM   PHQ-9    Little interest or pleasure in doing things 0   Feeling down, depressed, or hopeless 0   PHQ-2 Score 0   PHQ-9 Total Score 0      Mood Reassuring today  Recommend lifestyle/behavioral modifications to enhance mood (exercise, sunshine, mood apps, nutritional modifications, etc).     Reviewed gestational age precautions and activity goals/limitations  Nutritional counseling as well as specific goals based on current maternal and fetal status    Addressed normal pregnancy complaints, reassured and offered suggestions for care  Options for GERD, constipation, other common complaints reviewed    Reviewed gestational age appropriate preventive care regarding communicable disease transmission and vaccines as appropriate (including flu, TDaP >28wk, RSV 32-36wk (-), as well as, COVID.)  All questions answered and concerns discussed. Additional recommendations in problem list.     Exam:     Vitals:    25 1115   BP: 105/71      Physical Exam  Applicable labs reviewed.   Please see formal ultrasound

## 2025-01-17 ENCOUNTER — ROUTINE PRENATAL (OUTPATIENT)
Dept: OBGYN CLINIC | Age: 40
End: 2025-01-17

## 2025-01-17 VITALS — BODY MASS INDEX: 28.08 KG/M2 | WEIGHT: 179.3 LBS | SYSTOLIC BLOOD PRESSURE: 112 MMHG | DIASTOLIC BLOOD PRESSURE: 70 MMHG

## 2025-01-17 DIAGNOSIS — Z34.92 PRENATAL CARE, SECOND TRIMESTER: Primary | ICD-10-CM

## 2025-01-17 DIAGNOSIS — Z3A.24 24 WEEKS GESTATION OF PREGNANCY: ICD-10-CM

## 2025-01-17 DIAGNOSIS — Z13.89 SCREENING FOR GENITOURINARY CONDITION: ICD-10-CM

## 2025-01-17 LAB
GLUCOSE URINE, POC: NEGATIVE
PROTEIN,URINE, POC: NEGATIVE

## 2025-01-21 ENCOUNTER — ROUTINE PRENATAL (OUTPATIENT)
Dept: OBGYN CLINIC | Age: 40
End: 2025-01-21
Payer: MEDICAID

## 2025-01-21 VITALS — SYSTOLIC BLOOD PRESSURE: 108 MMHG | DIASTOLIC BLOOD PRESSURE: 74 MMHG

## 2025-01-21 DIAGNOSIS — O30.032 MONOCHORIONIC DIAMNIOTIC TWIN GESTATION IN SECOND TRIMESTER: ICD-10-CM

## 2025-01-21 DIAGNOSIS — O09.522 MULTIGRAVIDA OF ADVANCED MATERNAL AGE IN SECOND TRIMESTER: ICD-10-CM

## 2025-01-21 DIAGNOSIS — O43.192 MARGINAL INSERTION OF UMBILICAL CORD AFFECTING MANAGEMENT OF MOTHER IN SECOND TRIMESTER: ICD-10-CM

## 2025-01-21 DIAGNOSIS — O09.812 HIGH RISK PREGNANCY DUE TO ASSISTED REPRODUCTIVE TECHNOLOGY IN SECOND TRIMESTER: Primary | ICD-10-CM

## 2025-01-21 DIAGNOSIS — Z3A.24 24 WEEKS GESTATION OF PREGNANCY: ICD-10-CM

## 2025-01-21 PROCEDURE — 76820 UMBILICAL ARTERY ECHO: CPT | Performed by: OBSTETRICS & GYNECOLOGY

## 2025-01-21 PROCEDURE — 76816 OB US FOLLOW-UP PER FETUS: CPT | Performed by: OBSTETRICS & GYNECOLOGY

## 2025-01-21 PROCEDURE — 99214 OFFICE O/P EST MOD 30 MIN: CPT | Performed by: OBSTETRICS & GYNECOLOGY

## 2025-01-21 PROCEDURE — 76821 MIDDLE CEREBRAL ARTERY ECHO: CPT | Performed by: OBSTETRICS & GYNECOLOGY

## 2025-01-21 ASSESSMENT — PATIENT HEALTH QUESTIONNAIRE - PHQ9
SUM OF ALL RESPONSES TO PHQ QUESTIONS 1-9: 0
2. FEELING DOWN, DEPRESSED OR HOPELESS: NOT AT ALL
SUM OF ALL RESPONSES TO PHQ QUESTIONS 1-9: 0
1. LITTLE INTEREST OR PLEASURE IN DOING THINGS: NOT AT ALL
SUM OF ALL RESPONSES TO PHQ9 QUESTIONS 1 & 2: 0

## 2025-01-21 NOTE — PROGRESS NOTES
[facility] MATERNAL FETAL MEDICINE    [address] [place] [address]  P- [phone]  [phone]     MFM Follow-up Visit  Kim Hoffmann (1985) is a 39 y.o.  at 24w4d with 2025, by Other Basis.   Presents for evaluation of the following chief complaint(s):   Chief Complaint   Patient presents with    High Risk Pregnancy     AMA, Twins, IVF      w/ HIT Services present for entire visit.     Patient is a SAHM. Expecting 2 girls, Joy and Martita.   Patient is scheduled to see Primary OB ([facility]) on 25.  Support person/friend present today.     Interval history since prior appt reviewed and chart updated as indicated.    No HAs, edema.  Denies preeclamptic symptoms.  Reports good fetal movement.  No bleeding, LOF, cramping, ctxs, or vaginal pressure.        Mood evaluated today based on discussion with pt and PHQ screen.       2025     9:17 AM   PHQ-9    Little interest or pleasure in doing things 0   Feeling down, depressed, or hopeless 0   PHQ-2 Score 0   PHQ-9 Total Score 0      Mood Reassuring today  Recommend lifestyle/behavioral modifications to enhance mood (exercise, sunshine, mood apps, nutritional modifications, etc).     Reviewed gestational age precautions and activity goals/limitations  Nutritional counseling as well as specific goals based on current maternal and fetal status    Addressed normal pregnancy complaints, reassured and offered suggestions for care  Options for GERD, constipation, other common complaints reviewed    Reviewed gestational age appropriate preventive care regarding communicable disease transmission and vaccines as appropriate (including flu, TDaP >28wk, RSV 32-36wk (-), as well as, COVID.)  All questions answered and concerns discussed. Additional recommendations in problem list.     Exam:     Vitals:    25 0925   BP: 108/74      Physical Exam  Applicable labs reviewed.   Please see formal

## 2025-02-04 ENCOUNTER — ROUTINE PRENATAL (OUTPATIENT)
Dept: OBGYN CLINIC | Age: 40
End: 2025-02-04
Payer: MEDICAID

## 2025-02-04 VITALS — SYSTOLIC BLOOD PRESSURE: 106 MMHG | DIASTOLIC BLOOD PRESSURE: 71 MMHG

## 2025-02-04 DIAGNOSIS — O09.812 HIGH RISK PREGNANCY DUE TO ASSISTED REPRODUCTIVE TECHNOLOGY IN SECOND TRIMESTER: ICD-10-CM

## 2025-02-04 DIAGNOSIS — Z3A.26 26 WEEKS GESTATION OF PREGNANCY: ICD-10-CM

## 2025-02-04 DIAGNOSIS — O30.032 MONOCHORIONIC DIAMNIOTIC TWIN GESTATION IN SECOND TRIMESTER: ICD-10-CM

## 2025-02-04 DIAGNOSIS — O09.522 MULTIGRAVIDA OF ADVANCED MATERNAL AGE IN SECOND TRIMESTER: Primary | ICD-10-CM

## 2025-02-04 DIAGNOSIS — O43.192 MARGINAL INSERTION OF UMBILICAL CORD AFFECTING MANAGEMENT OF MOTHER IN SECOND TRIMESTER: ICD-10-CM

## 2025-02-04 PROCEDURE — 76820 UMBILICAL ARTERY ECHO: CPT | Performed by: OBSTETRICS & GYNECOLOGY

## 2025-02-04 PROCEDURE — 76821 MIDDLE CEREBRAL ARTERY ECHO: CPT | Performed by: OBSTETRICS & GYNECOLOGY

## 2025-02-04 PROCEDURE — 99214 OFFICE O/P EST MOD 30 MIN: CPT | Performed by: OBSTETRICS & GYNECOLOGY

## 2025-02-04 PROCEDURE — 76816 OB US FOLLOW-UP PER FETUS: CPT | Performed by: OBSTETRICS & GYNECOLOGY

## 2025-02-04 ASSESSMENT — PATIENT HEALTH QUESTIONNAIRE - PHQ9
SUM OF ALL RESPONSES TO PHQ QUESTIONS 1-9: 0
SUM OF ALL RESPONSES TO PHQ9 QUESTIONS 1 & 2: 0
2. FEELING DOWN, DEPRESSED OR HOPELESS: NOT AT ALL
SUM OF ALL RESPONSES TO PHQ QUESTIONS 1-9: 0
1. LITTLE INTEREST OR PLEASURE IN DOING THINGS: NOT AT ALL

## 2025-02-04 NOTE — PROGRESS NOTES
NIPT  Di/[place]obb  Referral to [facility].  PREG patient.  HX BTL.          Castillo Major MD   An electronic signature was used to authenticate this note.  Return in about 2 weeks (around 2/18/2025) for TTTS.     I have spent 30 minutes reviewing previous notes, test results and face to face with the patient discussing the diagnosis and importance of compliance with the treatment plan, as well as documenting on the day of the visit 02/04/2025. Normal ultrasound findings are not included in this time calculation. Full ultrasound data in ultrasound report. Limited ultrasound data included in office consult note.

## 2025-02-12 ENCOUNTER — ROUTINE PRENATAL (OUTPATIENT)
Dept: OBGYN CLINIC | Age: 40
End: 2025-02-12

## 2025-02-12 ENCOUNTER — NURSE ONLY (OUTPATIENT)
Dept: OBGYN CLINIC | Age: 40
End: 2025-02-12

## 2025-02-12 VITALS — BODY MASS INDEX: 28.38 KG/M2 | DIASTOLIC BLOOD PRESSURE: 80 MMHG | SYSTOLIC BLOOD PRESSURE: 118 MMHG | WEIGHT: 181.2 LBS

## 2025-02-12 DIAGNOSIS — Z13.0 SCREENING, ANEMIA, DEFICIENCY, IRON: ICD-10-CM

## 2025-02-12 DIAGNOSIS — Z13.1 SCREENING FOR DIABETES MELLITUS: ICD-10-CM

## 2025-02-12 DIAGNOSIS — Z11.3 SCREEN FOR STD (SEXUALLY TRANSMITTED DISEASE): ICD-10-CM

## 2025-02-12 DIAGNOSIS — Z13.89 SCREENING FOR GENITOURINARY CONDITION: ICD-10-CM

## 2025-02-12 DIAGNOSIS — Z3A.27 27 WEEKS GESTATION OF PREGNANCY: ICD-10-CM

## 2025-02-12 DIAGNOSIS — O43.192 MARGINAL INSERTION OF UMBILICAL CORD AFFECTING MANAGEMENT OF MOTHER IN SECOND TRIMESTER: ICD-10-CM

## 2025-02-12 DIAGNOSIS — O30.032 MONOCHORIONIC DIAMNIOTIC TWIN GESTATION IN SECOND TRIMESTER: ICD-10-CM

## 2025-02-12 DIAGNOSIS — O09.522 MULTIGRAVIDA OF ADVANCED MATERNAL AGE IN SECOND TRIMESTER: Primary | ICD-10-CM

## 2025-02-12 DIAGNOSIS — O09.812 HIGH RISK PREGNANCY DUE TO ASSISTED REPRODUCTIVE TECHNOLOGY IN SECOND TRIMESTER: ICD-10-CM

## 2025-02-12 LAB
ERYTHROCYTE [DISTWIDTH] IN BLOOD BY AUTOMATED COUNT: 12.2 % (ref 11.9–14.6)
GLUCOSE 1 HOUR: 122 MG/DL
GLUCOSE URINE, POC: NEGATIVE
HCT VFR BLD AUTO: 31.8 % (ref 35.8–46.3)
HGB BLD-MCNC: 10.3 G/DL (ref 11.7–15.4)
MCH RBC QN AUTO: 33.7 PG (ref 26.1–32.9)
MCHC RBC AUTO-ENTMCNC: 32.4 G/DL (ref 31.4–35)
MCV RBC AUTO: 103.9 FL (ref 82–102)
NRBC # BLD: 0 K/UL (ref 0–0.2)
PLATELET # BLD AUTO: 237 K/UL (ref 150–450)
PMV BLD AUTO: 11.3 FL (ref 9.4–12.3)
PROTEIN,URINE, POC: NEGATIVE
RBC # BLD AUTO: 3.06 M/UL (ref 4.05–5.2)
T PALLIDUM AB SER QL IA: NONREACTIVE
WBC # BLD AUTO: 10.6 K/UL (ref 4.3–11.1)

## 2025-02-12 NOTE — PROGRESS NOTES
Patient counseled face to face for 20 minutes regarding her condition and disposition.  She has MONO/DI TWINS followed by MFM.  Doing well without s/s TTTS.  She has a desire for primary  due to twins, Factual information regarding the medical condition and the need for  section was discussed with the patient, who verbalized understanding. The therapeutic rationale, benefits, risks and potential complications were discussed, including the possibility of pain, bleeding, infection, loss of function, disfigurement, death or other unforeseen complications. Alternatives to the procedure were discussed (including potential risks, complications and benefits of each). No guarantee was expressed or implied as to the results of the procedure. Informed consent was given, as well as a request to proceed.

## 2025-02-17 PROBLEM — O30.033 MONOCHORIONIC DIAMNIOTIC TWIN GESTATION IN THIRD TRIMESTER: Status: ACTIVE | Noted: 2024-11-27

## 2025-02-17 PROBLEM — O09.813 HIGH RISK PREGNANCY DUE TO ASSISTED REPRODUCTIVE TECHNOLOGY IN THIRD TRIMESTER: Status: ACTIVE | Noted: 2024-10-28

## 2025-02-17 PROBLEM — O09.523 MULTIGRAVIDA OF ADVANCED MATERNAL AGE IN THIRD TRIMESTER: Status: ACTIVE | Noted: 2024-10-28

## 2025-02-17 PROBLEM — O43.193 MARGINAL INSERTION OF UMBILICAL CORD AFFECTING MANAGEMENT OF MOTHER IN THIRD TRIMESTER: Status: ACTIVE | Noted: 2024-12-24

## 2025-02-18 ENCOUNTER — ROUTINE PRENATAL (OUTPATIENT)
Dept: OBGYN CLINIC | Age: 40
End: 2025-02-18
Payer: MEDICAID

## 2025-02-18 VITALS — DIASTOLIC BLOOD PRESSURE: 77 MMHG | SYSTOLIC BLOOD PRESSURE: 109 MMHG

## 2025-02-18 DIAGNOSIS — O30.033 MONOCHORIONIC DIAMNIOTIC TWIN GESTATION IN THIRD TRIMESTER: Primary | ICD-10-CM

## 2025-02-18 DIAGNOSIS — O09.813 HIGH RISK PREGNANCY DUE TO ASSISTED REPRODUCTIVE TECHNOLOGY IN THIRD TRIMESTER: ICD-10-CM

## 2025-02-18 DIAGNOSIS — O09.523 MULTIGRAVIDA OF ADVANCED MATERNAL AGE IN THIRD TRIMESTER: ICD-10-CM

## 2025-02-18 DIAGNOSIS — R03.0 SINGLE EPISODE OF ELEVATED BLOOD PRESSURE: ICD-10-CM

## 2025-02-18 DIAGNOSIS — Z3A.28 28 WEEKS GESTATION OF PREGNANCY: ICD-10-CM

## 2025-02-18 DIAGNOSIS — O99.613 GASTROESOPHAGEAL REFLUX DURING PREGNANCY IN THIRD TRIMESTER, ANTEPARTUM: ICD-10-CM

## 2025-02-18 DIAGNOSIS — O99.013 ANEMIA AFFECTING PREGNANCY IN THIRD TRIMESTER: ICD-10-CM

## 2025-02-18 DIAGNOSIS — O43.193 MARGINAL INSERTION OF UMBILICAL CORD AFFECTING MANAGEMENT OF MOTHER IN THIRD TRIMESTER: ICD-10-CM

## 2025-02-18 DIAGNOSIS — K21.9 GASTROESOPHAGEAL REFLUX DURING PREGNANCY IN THIRD TRIMESTER, ANTEPARTUM: ICD-10-CM

## 2025-02-18 PROCEDURE — 76828 ECHO EXAM OF FETAL HEART: CPT | Performed by: OBSTETRICS & GYNECOLOGY

## 2025-02-18 PROCEDURE — 76826 ECHO EXAM OF FETAL HEART: CPT | Performed by: OBSTETRICS & GYNECOLOGY

## 2025-02-18 PROCEDURE — 93325 DOPPLER ECHO COLOR FLOW MAPG: CPT | Performed by: OBSTETRICS & GYNECOLOGY

## 2025-02-18 PROCEDURE — 76819 FETAL BIOPHYS PROFIL W/O NST: CPT | Performed by: OBSTETRICS & GYNECOLOGY

## 2025-02-18 PROCEDURE — 99214 OFFICE O/P EST MOD 30 MIN: CPT | Performed by: OBSTETRICS & GYNECOLOGY

## 2025-02-18 PROCEDURE — 76821 MIDDLE CEREBRAL ARTERY ECHO: CPT | Performed by: OBSTETRICS & GYNECOLOGY

## 2025-02-18 PROCEDURE — 76816 OB US FOLLOW-UP PER FETUS: CPT | Performed by: OBSTETRICS & GYNECOLOGY

## 2025-02-18 RX ORDER — MULTIVIT-MIN/IRON/FOLIC ACID/K 18-600-40
CAPSULE ORAL
Qty: 30 CAPSULE | Refills: 5 | Status: SHIPPED | OUTPATIENT
Start: 2025-02-18

## 2025-02-18 RX ORDER — OMEPRAZOLE 40 MG/1
40 CAPSULE, DELAYED RELEASE ORAL
Qty: 30 CAPSULE | Refills: 5 | Status: SHIPPED | OUTPATIENT
Start: 2025-02-18

## 2025-02-18 RX ORDER — FERROUS SULFATE 325(65) MG
325 TABLET, DELAYED RELEASE (ENTERIC COATED) ORAL
Qty: 30 TABLET | Refills: 5 | Status: SHIPPED | OUTPATIENT
Start: 2025-02-18

## 2025-02-18 ASSESSMENT — PATIENT HEALTH QUESTIONNAIRE - PHQ9
1. LITTLE INTEREST OR PLEASURE IN DOING THINGS: NOT AT ALL
2. FEELING DOWN, DEPRESSED OR HOPELESS: NOT AT ALL
SUM OF ALL RESPONSES TO PHQ QUESTIONS 1-9: 0
SUM OF ALL RESPONSES TO PHQ9 QUESTIONS 1 & 2: 0

## 2025-02-18 NOTE — PROGRESS NOTES
[facility] MATERNAL FETAL MEDICINE    [address] [place] [address]  P- [phone]  T-[phone]     MFM Follow-up Visit  Kim Hoffmann (1985) is a 39 y.o.  at 28w4d with 2025, by Other Basis.   Presents for evaluation of the following chief complaint(s):   Chief Complaint   Patient presents with    Pregnancy Ultrasound     Growth/twins    High Risk Pregnancy       AMA, Twins, IVF          w/ HIT Services present for entire visit.      Patient is a SAHM. Expecting 2 girls, Joy and Martita.   Patient is scheduled to see Primary OB ([facility]) on 25.     Interval history since prior appt reviewed and chart updated as indicated.    No recent HA's.  Denies Edema. Denies Pre-eclamptic symptoms. No bleeding or LOF.  No contractions or cramping. No vaginal pressure recently. Reports acid reflux, controlled with Pepcid. Reports good fetal movement x 2    Mood evaluated today based on discussion with pt and PHQ screen.       2025    10:55 AM   PHQ-9    Little interest or pleasure in doing things 0   Feeling down, depressed, or hopeless 0   PHQ-2 Score 0   PHQ-9 Total Score 0      Mood Reassuring today  Recommend lifestyle/behavioral modifications to enhance mood (exercise, sunshine, mood apps, nutritional modifications, etc).   As mood stable and depression/anxiety well-controlled, will not begin medications today.    Reviewed gestational age precautions and activity goals/limitations; addressed normal pregnancy complaints, reassured and offered suggestions for care  Nutritional counseling as well as specific goals based on current maternal and fetal status; options for GERD, constipation, other common complaints reviewed  Reviewed gestational age appropriate preventive care regarding communicable disease transmission and vaccines as appropriate (including flu, TDaP >28wk, RSV 32-36wk (-), as well as, COVID.)  All questions answered and concerns  negative

## 2025-02-18 NOTE — PATIENT INSTRUCTIONS
Your Maternity Check List   Before Arriving to the Hospital     Find an OBGYN Doctor: [email or web address]  Maternity Pre-registration for Hospital: [email or web address]  Sign up for a Hospital Tour: [email or web address]/classes-events  [place] for Prenatal Classes: [email or web address]/classes-events   Car seat Safety Check: https://www.safekids.org/coalition/safe-kids-[facility]   Learn More: [email or web address]/health-care-services/womens-health/maternity   Download the [employer]® Pregnancy Kesha: (Scan QR Code below):  See educational videos, track your pregnancy, and Mom/Baby Care videos

## 2025-02-25 ENCOUNTER — ROUTINE PRENATAL (OUTPATIENT)
Dept: OBGYN CLINIC | Age: 40
End: 2025-02-25
Payer: MEDICAID

## 2025-02-25 VITALS — SYSTOLIC BLOOD PRESSURE: 118 MMHG | DIASTOLIC BLOOD PRESSURE: 66 MMHG | WEIGHT: 188 LBS | BODY MASS INDEX: 29.44 KG/M2

## 2025-02-25 DIAGNOSIS — O09.523 MULTIGRAVIDA OF ADVANCED MATERNAL AGE IN THIRD TRIMESTER: ICD-10-CM

## 2025-02-25 DIAGNOSIS — O30.033 MONOCHORIONIC DIAMNIOTIC TWIN GESTATION IN THIRD TRIMESTER: ICD-10-CM

## 2025-02-25 DIAGNOSIS — O99.013 ANEMIA AFFECTING PREGNANCY IN THIRD TRIMESTER: Primary | ICD-10-CM

## 2025-02-25 DIAGNOSIS — Z3A.29 29 WEEKS GESTATION OF PREGNANCY: ICD-10-CM

## 2025-02-25 DIAGNOSIS — O99.013 ANEMIA AFFECTING PREGNANCY IN THIRD TRIMESTER: ICD-10-CM

## 2025-02-25 DIAGNOSIS — Z13.89 SCREENING FOR GENITOURINARY CONDITION: ICD-10-CM

## 2025-02-25 DIAGNOSIS — O09.813 HIGH RISK PREGNANCY DUE TO ASSISTED REPRODUCTIVE TECHNOLOGY IN THIRD TRIMESTER: ICD-10-CM

## 2025-02-25 LAB
BASOPHILS # BLD: 0.03 K/UL (ref 0–0.2)
BASOPHILS NFR BLD: 0.3 % (ref 0–2)
DIFFERENTIAL METHOD BLD: ABNORMAL
EOSINOPHIL # BLD: 0.04 K/UL (ref 0–0.8)
EOSINOPHIL NFR BLD: 0.4 % (ref 0.5–7.8)
ERYTHROCYTE [DISTWIDTH] IN BLOOD BY AUTOMATED COUNT: 12.4 % (ref 11.9–14.6)
FERRITIN SERPL-MCNC: 12 NG/ML (ref 8–388)
GLUCOSE URINE, POC: NEGATIVE
HCT VFR BLD AUTO: 29.9 % (ref 35.8–46.3)
HGB BLD-MCNC: 9.9 G/DL (ref 11.7–15.4)
HGB RETIC QN AUTO: 31 PG (ref 29–35)
IMM GRANULOCYTES # BLD AUTO: 0.05 K/UL (ref 0–0.5)
IMM GRANULOCYTES NFR BLD AUTO: 0.5 % (ref 0–5)
IMM RETICS NFR: 27.8 % (ref 3–15.9)
IRON SATN MFR SERPL: 21 % (ref 20–50)
IRON SERPL-MCNC: 114 UG/DL (ref 35–100)
LYMPHOCYTES # BLD: 2.46 K/UL (ref 0.5–4.6)
LYMPHOCYTES NFR BLD: 23.5 % (ref 13–44)
MCH RBC QN AUTO: 33.2 PG (ref 26.1–32.9)
MCHC RBC AUTO-ENTMCNC: 33.1 G/DL (ref 31.4–35)
MCV RBC AUTO: 100.3 FL (ref 82–102)
MONOCYTES # BLD: 0.64 K/UL (ref 0.1–1.3)
MONOCYTES NFR BLD: 6.1 % (ref 4–12)
NEUTS SEG # BLD: 7.23 K/UL (ref 1.7–8.2)
NEUTS SEG NFR BLD: 69.2 % (ref 43–78)
NRBC # BLD: 0 K/UL (ref 0–0.2)
PLATELET # BLD AUTO: 235 K/UL (ref 150–450)
PMV BLD AUTO: 11.7 FL (ref 9.4–12.3)
PROTEIN,URINE, POC: NEGATIVE
RBC # BLD AUTO: 2.98 M/UL (ref 4.05–5.2)
RETICS # AUTO: 0.09 M/UL (ref 0.03–0.1)
RETICS/RBC NFR AUTO: 2.9 % (ref 0.3–2)
TIBC SERPL-MCNC: 542 UG/DL (ref 240–450)
UIBC SERPL-MCNC: 428 UG/DL (ref 112–347)
VIT B12 SERPL-MCNC: 552 PG/ML (ref 193–986)
WBC # BLD AUTO: 10.5 K/UL (ref 4.3–11.1)

## 2025-02-25 PROCEDURE — 81002 URINALYSIS NONAUTO W/O SCOPE: CPT | Performed by: NURSE PRACTITIONER

## 2025-02-25 PROCEDURE — 99213 OFFICE O/P EST LOW 20 MIN: CPT | Performed by: NURSE PRACTITIONER

## 2025-02-25 NOTE — PROGRESS NOTES
Doing well. No complaints. Iron studies today. Next US for TTTS 3/4/25 at [facility]. FHR initially via doppler reading 191 recheck 149 Discussed with Dr Mir to go.

## 2025-02-28 ENCOUNTER — CLINICAL DOCUMENTATION (OUTPATIENT)
Dept: OBGYN CLINIC | Age: 40
End: 2025-02-28

## 2025-03-04 ENCOUNTER — ROUTINE PRENATAL (OUTPATIENT)
Dept: OBGYN CLINIC | Age: 40
End: 2025-03-04
Payer: MEDICAID

## 2025-03-04 VITALS — SYSTOLIC BLOOD PRESSURE: 115 MMHG | DIASTOLIC BLOOD PRESSURE: 82 MMHG

## 2025-03-04 DIAGNOSIS — R03.0 SINGLE EPISODE OF ELEVATED BLOOD PRESSURE: ICD-10-CM

## 2025-03-04 DIAGNOSIS — O30.033 MONOCHORIONIC DIAMNIOTIC TWIN GESTATION IN THIRD TRIMESTER: ICD-10-CM

## 2025-03-04 DIAGNOSIS — O09.813 HIGH RISK PREGNANCY DUE TO ASSISTED REPRODUCTIVE TECHNOLOGY IN THIRD TRIMESTER: Primary | ICD-10-CM

## 2025-03-04 DIAGNOSIS — K21.9 GASTROESOPHAGEAL REFLUX DURING PREGNANCY IN THIRD TRIMESTER, ANTEPARTUM: ICD-10-CM

## 2025-03-04 DIAGNOSIS — O43.193 MARGINAL INSERTION OF UMBILICAL CORD AFFECTING MANAGEMENT OF MOTHER IN THIRD TRIMESTER: ICD-10-CM

## 2025-03-04 DIAGNOSIS — Z3A.30 30 WEEKS GESTATION OF PREGNANCY: ICD-10-CM

## 2025-03-04 DIAGNOSIS — O99.013 ANEMIA AFFECTING PREGNANCY IN THIRD TRIMESTER: ICD-10-CM

## 2025-03-04 DIAGNOSIS — O99.613 GASTROESOPHAGEAL REFLUX DURING PREGNANCY IN THIRD TRIMESTER, ANTEPARTUM: ICD-10-CM

## 2025-03-04 DIAGNOSIS — O09.523 MULTIGRAVIDA OF ADVANCED MATERNAL AGE IN THIRD TRIMESTER: ICD-10-CM

## 2025-03-04 PROCEDURE — 99214 OFFICE O/P EST MOD 30 MIN: CPT | Performed by: OBSTETRICS & GYNECOLOGY

## 2025-03-04 PROCEDURE — 76820 UMBILICAL ARTERY ECHO: CPT | Performed by: OBSTETRICS & GYNECOLOGY

## 2025-03-04 PROCEDURE — 76821 MIDDLE CEREBRAL ARTERY ECHO: CPT | Performed by: OBSTETRICS & GYNECOLOGY

## 2025-03-04 PROCEDURE — 76816 OB US FOLLOW-UP PER FETUS: CPT | Performed by: OBSTETRICS & GYNECOLOGY

## 2025-03-04 RX ORDER — FLUCONAZOLE 150 MG/1
TABLET ORAL
Qty: 2 TABLET | Refills: 0 | Status: SHIPPED | OUTPATIENT
Start: 2025-03-04

## 2025-03-04 ASSESSMENT — PATIENT HEALTH QUESTIONNAIRE - PHQ9
SUM OF ALL RESPONSES TO PHQ QUESTIONS 1-9: 0
1. LITTLE INTEREST OR PLEASURE IN DOING THINGS: NOT AT ALL
SUM OF ALL RESPONSES TO PHQ QUESTIONS 1-9: 0
2. FEELING DOWN, DEPRESSED OR HOPELESS: NOT AT ALL

## 2025-03-04 NOTE — PATIENT INSTRUCTIONS
Patient Education        Candidiasis vaginal: Instrucciones de cuidado  Vaginal Yeast Infection: Care Instructions  Instrucciones de cuidado     La candidiasis vaginal (infección por hongos en forma de levadura) es causada por un exceso de células de hongos de levadura en la vagina. Ipava es común en mujeres de todas las edades. La comezón, el flujo vaginal, la irritación y otros síntomas pueden ser molestos. Pattie las infecciones por hongos en forma de levadura no suelen causar otros problemas de akosua.  Algunos medicamentos pueden aumentar pizano riesgo de contraer la candidiasis vaginal. Estos incluyen antibióticos, pastillas anticonceptivas, hormonas y esteroides. También puede tener más probabilidades de tener candidiasis vaginal si está embarazada, tiene diabetes, usa lavados vaginales o viste ropas apretadas.  Con tratamiento, la mayoría de infecciones por hongos en forma de levadura mejoran en 2 o 3 días.  La atención de seguimiento es bharath parte clave de pizano tratamiento y seguridad. Asegúrese de hacer y acudir a todas las citas, y llame a pizano médico si está teniendo problemas. También es bharath buena idea saber los resultados de susan exámenes y mantener bharath lista de los medicamentos que nini.  ¿Cómo puede cuidarse en el hogar?  [place] los medicamentos exactamente aroldo le fueron recetados. Llame a pizano médico si jazzmine estar teniendo problemas con pizano medicamento.  Pregunte a pizano médico sobre medicamentos de venta kyle para las infecciones por hongos en forma de levadura. Podrían costar menos que los medicamentos recetados. Si usa un tratamiento de venta kyle, siria y siga todas las instrucciones de la etiqueta.  No use tampones karyna el tiempo que utilice la crema vaginal o supositorios. Los tampones pueden absorber el medicamento. Use toallas sanitarias en lugar de tampones.  Use ropa holgada de algodón. No utilice nylon ni otras telas que conserven el calor corporal y la humedad cerca de la piel.  Pruebe a dormir sin ropa

## 2025-03-04 NOTE — PROGRESS NOTES
status x 2. Twin A: AC- 78, MVP- 7.6 cm. Twin B: AC- 82, MVP- 5.3 cm. No sign of TTTS. For full details review formal ultrasound report.   01/21/25 [facility]: Reassuring fetal status x 2. Twin A: AC- 56, MVP- 7.5 cm. Twin B: AC- 73, MVP- 7.0 cm. No sign of TTTS. For full details review formal ultrasound report.   02/04/25 [facility]: Reassuring fetal status x 2. Twin A: AC- 42, MVP- 7.1 cm. Twin B: AC- 80, MVP- 5.6 cm. No sign of TTTS. For full details review formal ultrasound report.   02/18/25 [facility]: Reassuring fetal status x2. Growth today x2. No sign of TTTS; For full details review formal ultrasound report.   03/04/25 [facility]: Reassuring fetal status x 2. Twin A: AC- 42, MVP- 3.6 cm, BPP- 8/8. Twin B: AC- 52, MVP- 6.8 cm, BPP- 8/8. No sign of TTTS. For full details review formal ultrasound report.     Plan for delivery at 36 weeks unless otherwise indicated.         Assessment & Plan Note:      F/U [facility] 2 weeks TTTS.      Multigravida of advanced maternal age in third trimester 10/28/2024     Overview Note:     Referral to [facility] placed 10/28/24.  Genetics: PGT-A normal; no NIPT  GTT: 122  Flu:  Tdap:      High risk pregnancy due to assisted reproductive technology in third trimester 10/28/2024     Overview Note:     PREG patient due to male factor.  Single embryo transferred; frozen. PGT-A normal; declined NIPT  Di/[place]  Referral to [facility].  PREG patient.  HX BTL.            Castillo Major MD   An electronic signature was used to authenticate this note.  Return in about 2 weeks (around 3/18/2025) for twins.   I have spent 30 minutes reviewing previous notes, test results and face to face with the patient discussing the diagnosis and importance of compliance with the treatment plan, as well as documenting on the day of the visit 03/04/2025. Normal ultrasound findings are not included in this time calculation. Full ultrasound data in ultrasound report. Limited ultrasound data included in office consult note.

## 2025-03-12 ENCOUNTER — ROUTINE PRENATAL (OUTPATIENT)
Dept: OBGYN CLINIC | Age: 40
End: 2025-03-12
Payer: MEDICAID

## 2025-03-12 VITALS — DIASTOLIC BLOOD PRESSURE: 68 MMHG | SYSTOLIC BLOOD PRESSURE: 104 MMHG | WEIGHT: 186.3 LBS | BODY MASS INDEX: 29.18 KG/M2

## 2025-03-12 DIAGNOSIS — Z34.93 PRENATAL CARE, THIRD TRIMESTER: Primary | ICD-10-CM

## 2025-03-12 DIAGNOSIS — Z3A.31 31 WEEKS GESTATION OF PREGNANCY: ICD-10-CM

## 2025-03-12 DIAGNOSIS — Z13.89 SCREENING FOR GENITOURINARY CONDITION: ICD-10-CM

## 2025-03-12 LAB
GLUCOSE URINE, POC: NEGATIVE
PROTEIN,URINE, POC: NEGATIVE

## 2025-03-12 PROCEDURE — 99213 OFFICE O/P EST LOW 20 MIN: CPT | Performed by: OBSTETRICS & GYNECOLOGY

## 2025-03-12 PROCEDURE — 81002 URINALYSIS NONAUTO W/O SCOPE: CPT | Performed by: OBSTETRICS & GYNECOLOGY

## 2025-03-12 NOTE — PROGRESS NOTES
Getting iv iron.  Kick counts and ptl precautions. Rtc 2 weeks.  Seeing mfm next week.  Mono-di twins.  Will submit surgery request for pt seen.  Per mfm can be delivered at 36-37 weeks.  Pt would like to have her tubes removed  =hx of ectopic pregnancy.    Patient requests opportunistic risk reducing salpingectomy at the time of her csection.  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      Discussed at length the risks and benefits of concurrent bilateral salpingectomy with patient's upcoming scheduled abdominal surgery per recommendation of the Society of Gynecologic Oncology and the American College of Obstetricians and Gynecologists as this patient is at above average risk for development of complications related to future pregnancies.   Advised patient that the primary benefit of such surgery is a 65% reduction in future risk of ovarian cancer. Patient advised that large scale studies have not demonstrated an increase in estimated blood loss, complications, or operating time but that bleeding, infection, and injury to nearby organs are potential complications with this additional surgery. Finally, patient has been thoroughly counseled regarding the consequence of loss of fertility following this procedure. Patient understands that this loss of fertility cannot be reversed and has expressed via verbal and written consent that her wishes are to proceed with this surgery for the purposes of ovarian cancer reduction.

## 2025-03-13 PROBLEM — Z91.89 AT RISK FOR CANCER: Status: ACTIVE | Noted: 2025-03-13

## 2025-03-17 ENCOUNTER — TELEPHONE (OUTPATIENT)
Dept: OBGYN CLINIC | Age: 40
End: 2025-03-17

## 2025-03-17 NOTE — TELEPHONE ENCOUNTER
Called pt using interpreterID: [id number].   Offered pt PCS at 36w0d on 4/11/5. Pt okay w/ date - will give instructions at next appt. Scheduled w/ Litzy. [employer] Updated.

## 2025-03-18 ENCOUNTER — ROUTINE PRENATAL (OUTPATIENT)
Dept: OBGYN CLINIC | Age: 40
End: 2025-03-18

## 2025-03-18 VITALS — DIASTOLIC BLOOD PRESSURE: 81 MMHG | SYSTOLIC BLOOD PRESSURE: 117 MMHG

## 2025-03-18 DIAGNOSIS — Z3A.32 32 WEEKS GESTATION OF PREGNANCY: ICD-10-CM

## 2025-03-18 DIAGNOSIS — O99.613 GASTROESOPHAGEAL REFLUX DURING PREGNANCY IN THIRD TRIMESTER, ANTEPARTUM: ICD-10-CM

## 2025-03-18 DIAGNOSIS — O43.193 MARGINAL INSERTION OF UMBILICAL CORD AFFECTING MANAGEMENT OF MOTHER IN THIRD TRIMESTER: ICD-10-CM

## 2025-03-18 DIAGNOSIS — O09.813 HIGH RISK PREGNANCY DUE TO ASSISTED REPRODUCTIVE TECHNOLOGY IN THIRD TRIMESTER: Primary | ICD-10-CM

## 2025-03-18 DIAGNOSIS — O09.523 MULTIGRAVIDA OF ADVANCED MATERNAL AGE IN THIRD TRIMESTER: ICD-10-CM

## 2025-03-18 DIAGNOSIS — K21.9 GASTROESOPHAGEAL REFLUX DURING PREGNANCY IN THIRD TRIMESTER, ANTEPARTUM: ICD-10-CM

## 2025-03-18 DIAGNOSIS — O30.033 MONOCHORIONIC DIAMNIOTIC TWIN GESTATION IN THIRD TRIMESTER: ICD-10-CM

## 2025-03-18 DIAGNOSIS — O99.013 ANEMIA AFFECTING PREGNANCY IN THIRD TRIMESTER: ICD-10-CM

## 2025-03-18 DIAGNOSIS — R03.0 SINGLE EPISODE OF ELEVATED BLOOD PRESSURE: ICD-10-CM

## 2025-03-18 ASSESSMENT — PATIENT HEALTH QUESTIONNAIRE - PHQ9
1. LITTLE INTEREST OR PLEASURE IN DOING THINGS: NOT AT ALL
SUM OF ALL RESPONSES TO PHQ QUESTIONS 1-9: 0
2. FEELING DOWN, DEPRESSED OR HOPELESS: NOT AT ALL
SUM OF ALL RESPONSES TO PHQ QUESTIONS 1-9: 0

## 2025-03-18 NOTE — PATIENT INSTRUCTIONS
Your Maternity Check List   Before Arriving to the Hospital     Find an OBGYN Doctor: [email or web address]  Maternity Pre-registration for Hospital: [email or web address]  Sign up for a Hospital Tour: www.Queue-it/about-us/classes-events  [place] for Prenatal Classes: www.Queue-it/about-us/classes-events   Car seat Safety Check: https://www.safekids.org/coalition/safe-kids-[facility]   Learn More: www.Queue-it/health-care-services/womens-health/maternity   Download the [employer]® Pregnancy Kesha: (Scan QR Code below):  See educational videos, track your pregnancy, and Mom/Baby Care videos

## 2025-03-26 ENCOUNTER — ROUTINE PRENATAL (OUTPATIENT)
Dept: OBGYN CLINIC | Age: 40
End: 2025-03-26
Payer: MEDICAID

## 2025-03-26 VITALS
SYSTOLIC BLOOD PRESSURE: 108 MMHG | BODY MASS INDEX: 29.96 KG/M2 | HEIGHT: 67 IN | DIASTOLIC BLOOD PRESSURE: 82 MMHG | WEIGHT: 190.9 LBS

## 2025-03-26 DIAGNOSIS — R03.0 SINGLE EPISODE OF ELEVATED BLOOD PRESSURE: ICD-10-CM

## 2025-03-26 DIAGNOSIS — O43.193 MARGINAL INSERTION OF UMBILICAL CORD AFFECTING MANAGEMENT OF MOTHER IN THIRD TRIMESTER: ICD-10-CM

## 2025-03-26 DIAGNOSIS — O09.523 MULTIGRAVIDA OF ADVANCED MATERNAL AGE IN THIRD TRIMESTER: ICD-10-CM

## 2025-03-26 DIAGNOSIS — O30.033 MONOCHORIONIC DIAMNIOTIC TWIN GESTATION IN THIRD TRIMESTER: ICD-10-CM

## 2025-03-26 DIAGNOSIS — Z34.93 PRENATAL CARE, THIRD TRIMESTER: Primary | ICD-10-CM

## 2025-03-26 DIAGNOSIS — Z13.89 SCREENING FOR GENITOURINARY CONDITION: ICD-10-CM

## 2025-03-26 DIAGNOSIS — Z91.89 AT RISK FOR CANCER: ICD-10-CM

## 2025-03-26 DIAGNOSIS — O09.813 HIGH RISK PREGNANCY DUE TO ASSISTED REPRODUCTIVE TECHNOLOGY IN THIRD TRIMESTER: ICD-10-CM

## 2025-03-26 DIAGNOSIS — O99.013 ANEMIA AFFECTING PREGNANCY IN THIRD TRIMESTER: ICD-10-CM

## 2025-03-26 DIAGNOSIS — K21.9 GASTROESOPHAGEAL REFLUX DURING PREGNANCY IN THIRD TRIMESTER, ANTEPARTUM: ICD-10-CM

## 2025-03-26 DIAGNOSIS — Z3A.33 33 WEEKS GESTATION OF PREGNANCY: ICD-10-CM

## 2025-03-26 DIAGNOSIS — Z36.85 ANTENATAL SCREENING FOR STREPTOCOCCUS B: ICD-10-CM

## 2025-03-26 DIAGNOSIS — O99.613 GASTROESOPHAGEAL REFLUX DURING PREGNANCY IN THIRD TRIMESTER, ANTEPARTUM: ICD-10-CM

## 2025-03-26 LAB
GLUCOSE URINE, POC: NEGATIVE
PROTEIN,URINE, POC: NEGATIVE

## 2025-03-26 PROCEDURE — 81002 URINALYSIS NONAUTO W/O SCOPE: CPT | Performed by: NURSE PRACTITIONER

## 2025-03-26 PROCEDURE — 99213 OFFICE O/P EST LOW 20 MIN: CPT | Performed by: NURSE PRACTITIONER

## 2025-03-26 NOTE — PROGRESS NOTES
Doing well  Gfm x2  Bp stable  Seeing [facility] 4/1  C/s scheduled 4/11 @ 36wk, so GBS done today.  Ptl precautions  Rtc 2 wk    1. Prenatal care, third trimester  -     AMB POC OB URINE DIP  2. Multigravida of advanced maternal age in third trimester  Overview:  Referral to [facility] placed 10/28/24.  Genetics: PGT-A normal; no NIPT  GTT: 122  Flu:  Tdap:  Orders:  -     AMB POC OB URINE DIP  3. High risk pregnancy due to assisted reproductive technology in third trimester  Overview:  PREG patient due to male factor.  Single embryo transferred; frozen. PGT-A normal; declined NIPT  Di/Door  Referral to [facility].  PREG patient.  HX BTL.      Orders:  -     AMB POC OB URINE DIP  4. Monochorionic diamniotic twin gestation in third trimester  Overview:  11/27/24 [facility]: MonoDi twinsMs. Normal early anatomy x2. No signs of TTTS. Embryo had PGT testing. Genetic counseling done by MD.  For full details review formal ultrasound report.   12/11/24 [facility]: Reassuring fetal status x 2. Twin A: AC- 76%, MVP- 4.3 cm. Twin B: AC- 91%, MVP- 3.8 cm. CL- 4.5 cm. No sign of TTTS. For full details review formal ultrasound report.   12/24/24 [facility]: Reassuring fetal status x 2. Twin A: normal growth, Normal fluid. Twin B: normal growth, normal fluid. No sign of TTTS. For full details review formal ultrasound report.   01/07/25 [facility]: Reassuring fetal status x 2. Twin A: AC- 78, MVP- 7.6 cm. Twin B: AC- 82, MVP- 5.3 cm. No sign of TTTS. For full details review formal ultrasound report.   01/21/25 [facility]: Reassuring fetal status x 2. Twin A: AC- 56, MVP- 7.5 cm. Twin B: AC- 73, MVP- 7.0 cm. No sign of TTTS. For full details review formal ultrasound report.   02/04/25 [facility]: Reassuring fetal status x 2. Twin A: AC- 42, MVP- 7.1 cm. Twin B: AC- 80, MVP- 5.6 cm. No sign of TTTS. For full details review formal ultrasound report.   02/18/25 [facility]: Reassuring fetal status x2. Growth today x2. No sign of TTTS; For full details review formal ultrasound report.   03/04/25

## 2025-03-26 NOTE — PROGRESS NOTES
Patient reports intermittent skin redness and vein swelling all over body in the past few weeks. Patient denies any pain or known triggers. Patient denies accompanying symptoms.  Patient denies change in baby movement since onset.

## 2025-03-29 LAB
BACTERIA SPEC CULT: NORMAL
SERVICE CMNT-IMP: NORMAL

## 2025-03-31 ENCOUNTER — RESULTS FOLLOW-UP (OUTPATIENT)
Dept: OBGYN CLINIC | Age: 40
End: 2025-03-31

## 2025-04-01 ENCOUNTER — ROUTINE PRENATAL (OUTPATIENT)
Dept: OBGYN CLINIC | Age: 40
End: 2025-04-01
Payer: MEDICAID

## 2025-04-01 VITALS — HEART RATE: 69 BPM | DIASTOLIC BLOOD PRESSURE: 86 MMHG | SYSTOLIC BLOOD PRESSURE: 121 MMHG

## 2025-04-01 DIAGNOSIS — K21.9 GASTROESOPHAGEAL REFLUX DURING PREGNANCY IN THIRD TRIMESTER, ANTEPARTUM: ICD-10-CM

## 2025-04-01 DIAGNOSIS — O09.523 MULTIGRAVIDA OF ADVANCED MATERNAL AGE IN THIRD TRIMESTER: ICD-10-CM

## 2025-04-01 DIAGNOSIS — O99.013 ANEMIA AFFECTING PREGNANCY IN THIRD TRIMESTER: ICD-10-CM

## 2025-04-01 DIAGNOSIS — O09.813 HIGH RISK PREGNANCY DUE TO ASSISTED REPRODUCTIVE TECHNOLOGY IN THIRD TRIMESTER: ICD-10-CM

## 2025-04-01 DIAGNOSIS — O36.5990 FETAL GROWTH RESTRICTION ANTEPARTUM: ICD-10-CM

## 2025-04-01 DIAGNOSIS — Z91.89 AT RISK FOR CANCER: ICD-10-CM

## 2025-04-01 DIAGNOSIS — O43.193 MARGINAL INSERTION OF UMBILICAL CORD AFFECTING MANAGEMENT OF MOTHER IN THIRD TRIMESTER: ICD-10-CM

## 2025-04-01 DIAGNOSIS — O99.613 GASTROESOPHAGEAL REFLUX DURING PREGNANCY IN THIRD TRIMESTER, ANTEPARTUM: ICD-10-CM

## 2025-04-01 DIAGNOSIS — R03.0 SINGLE EPISODE OF ELEVATED BLOOD PRESSURE: ICD-10-CM

## 2025-04-01 DIAGNOSIS — O30.033 MONOCHORIONIC DIAMNIOTIC TWIN GESTATION IN THIRD TRIMESTER: Primary | ICD-10-CM

## 2025-04-01 PROCEDURE — 99214 OFFICE O/P EST MOD 30 MIN: CPT | Performed by: OBSTETRICS & GYNECOLOGY

## 2025-04-01 PROCEDURE — 76819 FETAL BIOPHYS PROFIL W/O NST: CPT | Performed by: OBSTETRICS & GYNECOLOGY

## 2025-04-01 PROCEDURE — 76816 OB US FOLLOW-UP PER FETUS: CPT | Performed by: OBSTETRICS & GYNECOLOGY

## 2025-04-01 PROCEDURE — 76821 MIDDLE CEREBRAL ARTERY ECHO: CPT | Performed by: OBSTETRICS & GYNECOLOGY

## 2025-04-01 PROCEDURE — 76820 UMBILICAL ARTERY ECHO: CPT | Performed by: OBSTETRICS & GYNECOLOGY

## 2025-04-01 NOTE — PROGRESS NOTES
[facility] MATERNAL FETAL MEDICINE    [address] [place] [address]  P- [phone]  [phone]     MFM Follow-up Visit  Kim Hoffmann (1985) is a 39 y.o.  at 34w4d with 2025, by Other Basis.   Presents for evaluation of the following chief complaint(s):   Chief Complaint   Patient presents with    Ultrasound    High Risk Pregnancy     AMA, Anemia, Twins, IVF     [facility] [place] present for entire appointment     Patient is a SAHM.     Expecting 2 girls, Joy and Martita.     Patient is scheduled to see Primary OB ([facility]) on 25, Primary C/S 25    Interval history since prior appt reviewed and chart updated as indicated.      No recent HA's.  Denies Edema. Denies Pre-eclamptic symptoms. No bleeding or LOF.  Has occasional [place] contractions that resolve, nothing regular or painful. Has vaginal pressure  . Reports good fetal movement. X2     Labor Precautions    Call OB or go to [facility] 4th floor JENN if:   more than 6 contractions in an hour which do not resolve with rest  bleeding like a period  water breaking  baby not moving well     Mood evaluated today based on discussion with pt and PHQ screen.       2025     9:12 AM   PHQ-9    Little interest or pleasure in doing things 0   Feeling down, depressed, or hopeless 0   PHQ-2 Score 0   PHQ-9 Total Score 0      Mood Reassuring today  Recommend lifestyle/behavioral modifications to enhance mood (exercise, sunshine, mood apps, nutritional modifications, etc).     Reviewed gestational age precautions and activity goals/limitations; addressed normal pregnancy complaints, reassured and offered suggestions for care  Nutritional counseling as well as specific goals based on current maternal and fetal status; options for GERD, constipation, other common complaints reviewed  Reviewed gestational age appropriate preventive care regarding communicable disease transmission and vaccines as appropriate

## 2025-04-02 PROBLEM — O36.5990 FETAL GROWTH RESTRICTION ANTEPARTUM: Status: ACTIVE | Noted: 2025-04-02

## 2025-04-07 NOTE — PROGRESS NOTES
C Section scheduled for 4/11/2025 with RRS      BPP Findings from Today (04/09/2025):  Fetus: B    BPP Secondary to: MONO/DI TWINS    BABY A:  Cephalic rt  FHT= 123  MVP= 5.0cm  BPP= 8/8    BABY B:  Cephalic LT  FHT= 135  MVP= 5.4 cm  BPP= 8/8    Anterior placenta grade 2 with multiple microcalcifications

## 2025-04-09 ENCOUNTER — ROUTINE PRENATAL (OUTPATIENT)
Dept: OBGYN CLINIC | Age: 40
End: 2025-04-09
Payer: MEDICAID

## 2025-04-09 ENCOUNTER — PROCEDURE VISIT (OUTPATIENT)
Dept: OBGYN CLINIC | Age: 40
End: 2025-04-09
Payer: MEDICAID

## 2025-04-09 VITALS — SYSTOLIC BLOOD PRESSURE: 112 MMHG | DIASTOLIC BLOOD PRESSURE: 84 MMHG | WEIGHT: 190.6 LBS | BODY MASS INDEX: 29.85 KG/M2

## 2025-04-09 DIAGNOSIS — O30.033 MONOCHORIONIC DIAMNIOTIC TWIN GESTATION IN THIRD TRIMESTER: Primary | ICD-10-CM

## 2025-04-09 DIAGNOSIS — Z13.89 SCREENING FOR GENITOURINARY CONDITION: ICD-10-CM

## 2025-04-09 DIAGNOSIS — Z34.93 PRENATAL CARE, THIRD TRIMESTER: Primary | ICD-10-CM

## 2025-04-09 DIAGNOSIS — Z3A.35 35 WEEKS GESTATION OF PREGNANCY: ICD-10-CM

## 2025-04-09 DIAGNOSIS — O09.523 MULTIGRAVIDA OF ADVANCED MATERNAL AGE IN THIRD TRIMESTER: ICD-10-CM

## 2025-04-09 LAB
GLUCOSE URINE, POC: NEGATIVE
PROTEIN,URINE, POC: NEGATIVE

## 2025-04-09 PROCEDURE — 76819 FETAL BIOPHYS PROFIL W/O NST: CPT | Performed by: OBSTETRICS & GYNECOLOGY

## 2025-04-09 PROCEDURE — 81002 URINALYSIS NONAUTO W/O SCOPE: CPT | Performed by: OBSTETRICS & GYNECOLOGY

## 2025-04-09 PROCEDURE — 99213 OFFICE O/P EST LOW 20 MIN: CPT | Performed by: OBSTETRICS & GYNECOLOGY

## 2025-04-09 NOTE — PROGRESS NOTES
Bpp /inocente today.  Having section and rros in 2 days.  Pt is seen with  screen.  Kick counts and ptl precautions.  Gbs negative.  Bpp 8/8 and inocente wnl.  See flow sheet

## 2025-04-10 ENCOUNTER — ANESTHESIA EVENT (OUTPATIENT)
Dept: OPERATING ROOM | Age: 40
End: 2025-04-10
Payer: MEDICAID

## 2025-04-11 ENCOUNTER — HOSPITAL ENCOUNTER (INPATIENT)
Age: 40
LOS: 4 days | Discharge: HOME | DRG: 540 | End: 2025-04-15
Attending: OBSTETRICS & GYNECOLOGY | Admitting: OBSTETRICS & GYNECOLOGY
Payer: MEDICAID

## 2025-04-11 ENCOUNTER — ANESTHESIA (OUTPATIENT)
Dept: OPERATING ROOM | Age: 40
End: 2025-04-11
Payer: MEDICAID

## 2025-04-11 ENCOUNTER — APPOINTMENT (OUTPATIENT)
Dept: LABOR AND DELIVERY | Age: 40
DRG: 540 | End: 2025-04-11
Payer: MEDICAID

## 2025-04-11 LAB
ABO + RH BLD: NORMAL
BASE DEFICIT BLD-SCNC: 10.6 MMOL/L
BASE DEFICIT BLD-SCNC: 12.7 MMOL/L
BASE DEFICIT BLD-SCNC: 5.3 MMOL/L
BASE DEFICIT BLD-SCNC: 5.7 MMOL/L
BLOOD GROUP ANTIBODIES SERPL: NORMAL
ERYTHROCYTE [DISTWIDTH] IN BLOOD BY AUTOMATED COUNT: 17.3 % (ref 11.9–14.6)
HCO3 BLD-SCNC: 19.3 MMOL/L (ref 21–28)
HCO3 BLD-SCNC: 22 MMOL/L (ref 21–28)
HCO3 BLDV-SCNC: 18.3 MMOL/L (ref 22–29)
HCO3 BLDV-SCNC: 20.5 MMOL/L (ref 22–29)
HCT VFR BLD AUTO: 35.2 % (ref 35.8–46.3)
HGB BLD-MCNC: 11.8 G/DL (ref 11.7–15.4)
MCH RBC QN AUTO: 33.9 PG (ref 26.1–32.9)
MCHC RBC AUTO-ENTMCNC: 33.5 G/DL (ref 31.4–35)
MCV RBC AUTO: 101.1 FL (ref 82–102)
NRBC # BLD: 0 K/UL (ref 0–0.2)
PCO2 BLDCO: 40 MMHG (ref 32–68)
PCO2 BLDCO: 50 MMHG (ref 32–68)
PCO2 BLDCO: 51 MMHG (ref 32–68)
PCO2 BLDCO: 73 MMHG (ref 32–68)
PH BLDCO: 7.03 (ref 7.15–7.38)
PH BLDCO: 7.16 (ref 7.15–7.38)
PH BLDCO: 7.25 (ref 7.15–7.38)
PH BLDCO: 7.32 (ref 7.15–7.38)
PLATELET # BLD AUTO: 198 K/UL (ref 150–450)
PMV BLD AUTO: 12 FL (ref 9.4–12.3)
PO2 BLDCO: 12 MMHG (ref 45–95)
PO2 BLDCO: 18 MMHG (ref 45–95)
PO2 BLDCO: 22 MMHG (ref 45–95)
PO2 BLDCO: 34 MMHG (ref 45–95)
RBC # BLD AUTO: 3.48 M/UL (ref 4.05–5.2)
SAO2 % BLD: 10.1 % (ref 94–98)
SAO2 % BLD: 19.7 % (ref 94–98)
SAO2 % BLDV: 22.9 % (ref 65–88)
SAO2 % BLDV: 48.6 % (ref 65–88)
SERVICE CMNT-IMP: ABNORMAL
SPECIMEN EXP DATE BLD: NORMAL
SPECIMEN TYPE: ABNORMAL
T PALLIDUM AB SER QL IA: NONREACTIVE
WBC # BLD AUTO: 9.8 K/UL (ref 4.3–11.1)

## 2025-04-11 PROCEDURE — 2709999900 HC NON-CHARGEABLE SUPPLY: Performed by: OBSTETRICS & GYNECOLOGY

## 2025-04-11 PROCEDURE — 6360000002 HC RX W HCPCS: Performed by: ANESTHESIOLOGY

## 2025-04-11 PROCEDURE — 86900 BLOOD TYPING SEROLOGIC ABO: CPT

## 2025-04-11 PROCEDURE — 6370000000 HC RX 637 (ALT 250 FOR IP): Performed by: ANESTHESIOLOGY

## 2025-04-11 PROCEDURE — 6360000002 HC RX W HCPCS: Performed by: NURSE ANESTHETIST, CERTIFIED REGISTERED

## 2025-04-11 PROCEDURE — 3600000014 HC SURGERY LEVEL 4 ADDTL 15MIN: Performed by: OBSTETRICS & GYNECOLOGY

## 2025-04-11 PROCEDURE — 2580000003 HC RX 258: Performed by: ANESTHESIOLOGY

## 2025-04-11 PROCEDURE — 3600000004 HC SURGERY LEVEL 4 BASE: Performed by: OBSTETRICS & GYNECOLOGY

## 2025-04-11 PROCEDURE — 3700000001 HC ADD 15 MINUTES (ANESTHESIA): Performed by: OBSTETRICS & GYNECOLOGY

## 2025-04-11 PROCEDURE — 2500000003 HC RX 250 WO HCPCS: Performed by: NURSE ANESTHETIST, CERTIFIED REGISTERED

## 2025-04-11 PROCEDURE — 85027 COMPLETE CBC AUTOMATED: CPT

## 2025-04-11 PROCEDURE — 2580000003 HC RX 258: Performed by: NURSE ANESTHETIST, CERTIFIED REGISTERED

## 2025-04-11 PROCEDURE — 2500000003 HC RX 250 WO HCPCS: Performed by: OBSTETRICS & GYNECOLOGY

## 2025-04-11 PROCEDURE — 2580000003 HC RX 258: Performed by: OBSTETRICS & GYNECOLOGY

## 2025-04-11 PROCEDURE — 7100000001 HC PACU RECOVERY - ADDTL 15 MIN: Performed by: OBSTETRICS & GYNECOLOGY

## 2025-04-11 PROCEDURE — 6360000002 HC RX W HCPCS: Performed by: OBSTETRICS & GYNECOLOGY

## 2025-04-11 PROCEDURE — 0UB50ZZ EXCISION OF RIGHT FALLOPIAN TUBE, OPEN APPROACH: ICD-10-PCS | Performed by: OBSTETRICS & GYNECOLOGY

## 2025-04-11 PROCEDURE — 36600 WITHDRAWAL OF ARTERIAL BLOOD: CPT

## 2025-04-11 PROCEDURE — 2720000010 HC SURG SUPPLY STERILE: Performed by: OBSTETRICS & GYNECOLOGY

## 2025-04-11 PROCEDURE — 3700000000 HC ANESTHESIA ATTENDED CARE: Performed by: OBSTETRICS & GYNECOLOGY

## 2025-04-11 PROCEDURE — 86850 RBC ANTIBODY SCREEN: CPT

## 2025-04-11 PROCEDURE — 7100000000 HC PACU RECOVERY - FIRST 15 MIN: Performed by: OBSTETRICS & GYNECOLOGY

## 2025-04-11 PROCEDURE — 86901 BLOOD TYPING SEROLOGIC RH(D): CPT

## 2025-04-11 PROCEDURE — 1100000000 HC RM PRIVATE

## 2025-04-11 PROCEDURE — 82803 BLOOD GASES ANY COMBINATION: CPT

## 2025-04-11 PROCEDURE — 88302 TISSUE EXAM BY PATHOLOGIST: CPT

## 2025-04-11 PROCEDURE — 86780 TREPONEMA PALLIDUM: CPT

## 2025-04-11 RX ORDER — SODIUM CHLORIDE 9 MG/ML
INJECTION, SOLUTION INTRAVENOUS PRN
Status: DISCONTINUED | OUTPATIENT
Start: 2025-04-11 | End: 2025-04-15 | Stop reason: HOSPADM

## 2025-04-11 RX ORDER — SODIUM CHLORIDE 0.9 % (FLUSH) 0.9 %
5-40 SYRINGE (ML) INJECTION PRN
Status: DISCONTINUED | OUTPATIENT
Start: 2025-04-11 | End: 2025-04-11

## 2025-04-11 RX ORDER — KETOROLAC TROMETHAMINE 30 MG/ML
INJECTION, SOLUTION INTRAMUSCULAR; INTRAVENOUS
Status: DISCONTINUED | OUTPATIENT
Start: 2025-04-11 | End: 2025-04-11 | Stop reason: SDUPTHER

## 2025-04-11 RX ORDER — ONDANSETRON 2 MG/ML
4 INJECTION INTRAMUSCULAR; INTRAVENOUS EVERY 6 HOURS PRN
Status: DISCONTINUED | OUTPATIENT
Start: 2025-04-11 | End: 2025-04-11

## 2025-04-11 RX ORDER — IBUPROFEN 800 MG/1
800 TABLET, FILM COATED ORAL EVERY 8 HOURS
Status: DISCONTINUED | OUTPATIENT
Start: 2025-04-12 | End: 2025-04-11

## 2025-04-11 RX ORDER — SODIUM CHLORIDE 0.9 % (FLUSH) 0.9 %
5-40 SYRINGE (ML) INJECTION EVERY 12 HOURS SCHEDULED
Status: DISCONTINUED | OUTPATIENT
Start: 2025-04-11 | End: 2025-04-11

## 2025-04-11 RX ORDER — LIDOCAINE HYDROCHLORIDE 10 MG/ML
1 INJECTION, SOLUTION INFILTRATION; PERINEURAL
Status: DISCONTINUED | OUTPATIENT
Start: 2025-04-11 | End: 2025-04-11

## 2025-04-11 RX ORDER — ACETAMINOPHEN 500 MG
1000 TABLET ORAL EVERY 8 HOURS SCHEDULED
Status: DISCONTINUED | OUTPATIENT
Start: 2025-04-12 | End: 2025-04-15 | Stop reason: HOSPADM

## 2025-04-11 RX ORDER — SODIUM CHLORIDE 9 MG/ML
INJECTION, SOLUTION INTRAVENOUS PRN
Status: DISCONTINUED | OUTPATIENT
Start: 2025-04-11 | End: 2025-04-11

## 2025-04-11 RX ORDER — OXYCODONE HYDROCHLORIDE 5 MG/1
10 TABLET ORAL EVERY 4 HOURS PRN
Status: DISCONTINUED | OUTPATIENT
Start: 2025-04-11 | End: 2025-04-12

## 2025-04-11 RX ORDER — NALOXONE HYDROCHLORIDE 0.4 MG/ML
INJECTION, SOLUTION INTRAMUSCULAR; INTRAVENOUS; SUBCUTANEOUS PRN
Status: DISCONTINUED | OUTPATIENT
Start: 2025-04-11 | End: 2025-04-12

## 2025-04-11 RX ORDER — HYDROMORPHONE HYDROCHLORIDE 1 MG/ML
0.25 INJECTION, SOLUTION INTRAMUSCULAR; INTRAVENOUS; SUBCUTANEOUS EVERY 6 HOURS PRN
Status: DISCONTINUED | OUTPATIENT
Start: 2025-04-11 | End: 2025-04-12

## 2025-04-11 RX ORDER — ONDANSETRON 2 MG/ML
4 INJECTION INTRAMUSCULAR; INTRAVENOUS ONCE
Status: COMPLETED | OUTPATIENT
Start: 2025-04-11 | End: 2025-04-11

## 2025-04-11 RX ORDER — SODIUM CHLORIDE, SODIUM LACTATE, POTASSIUM CHLORIDE, CALCIUM CHLORIDE 600; 310; 30; 20 MG/100ML; MG/100ML; MG/100ML; MG/100ML
INJECTION, SOLUTION INTRAVENOUS CONTINUOUS
Status: DISCONTINUED | OUTPATIENT
Start: 2025-04-11 | End: 2025-04-11

## 2025-04-11 RX ORDER — OXYCODONE HYDROCHLORIDE 5 MG/1
5 TABLET ORAL EVERY 4 HOURS PRN
Status: DISCONTINUED | OUTPATIENT
Start: 2025-04-11 | End: 2025-04-12

## 2025-04-11 RX ORDER — SODIUM CHLORIDE 0.9 % (FLUSH) 0.9 %
5-40 SYRINGE (ML) INJECTION PRN
Status: DISCONTINUED | OUTPATIENT
Start: 2025-04-11 | End: 2025-04-15 | Stop reason: HOSPADM

## 2025-04-11 RX ORDER — ONDANSETRON 4 MG/1
4 TABLET, ORALLY DISINTEGRATING ORAL EVERY 8 HOURS PRN
Status: DISCONTINUED | OUTPATIENT
Start: 2025-04-12 | End: 2025-04-11 | Stop reason: SDUPTHER

## 2025-04-11 RX ORDER — SODIUM CHLORIDE, SODIUM LACTATE, POTASSIUM CHLORIDE, CALCIUM CHLORIDE 600; 310; 30; 20 MG/100ML; MG/100ML; MG/100ML; MG/100ML
INJECTION, SOLUTION INTRAVENOUS
Status: DISCONTINUED | OUTPATIENT
Start: 2025-04-11 | End: 2025-04-11 | Stop reason: SDUPTHER

## 2025-04-11 RX ORDER — ONDANSETRON 4 MG/1
4 TABLET, ORALLY DISINTEGRATING ORAL EVERY 8 HOURS PRN
Status: DISCONTINUED | OUTPATIENT
Start: 2025-04-11 | End: 2025-04-11

## 2025-04-11 RX ORDER — BUPIVACAINE HYDROCHLORIDE 7.5 MG/ML
INJECTION, SOLUTION INTRASPINAL
Status: COMPLETED | OUTPATIENT
Start: 2025-04-11 | End: 2025-04-11

## 2025-04-11 RX ORDER — KETOROLAC TROMETHAMINE 30 MG/ML
30 INJECTION, SOLUTION INTRAMUSCULAR; INTRAVENOUS EVERY 6 HOURS
Status: DISCONTINUED | OUTPATIENT
Start: 2025-04-12 | End: 2025-04-12

## 2025-04-11 RX ORDER — SODIUM CHLORIDE, SODIUM LACTATE, POTASSIUM CHLORIDE, AND CALCIUM CHLORIDE .6; .31; .03; .02 G/100ML; G/100ML; G/100ML; G/100ML
1000 INJECTION, SOLUTION INTRAVENOUS ONCE
Status: DISCONTINUED | OUTPATIENT
Start: 2025-04-11 | End: 2025-04-11

## 2025-04-11 RX ORDER — SODIUM CHLORIDE 0.9 % (FLUSH) 0.9 %
5-40 SYRINGE (ML) INJECTION EVERY 12 HOURS SCHEDULED
Status: DISCONTINUED | OUTPATIENT
Start: 2025-04-11 | End: 2025-04-12

## 2025-04-11 RX ORDER — ACETAMINOPHEN 325 MG/1
975 TABLET ORAL ONCE
Status: DISCONTINUED | OUTPATIENT
Start: 2025-04-11 | End: 2025-04-11

## 2025-04-11 RX ORDER — LANOLIN
CREAM (ML) TOPICAL
Status: DISCONTINUED | OUTPATIENT
Start: 2025-04-11 | End: 2025-04-15 | Stop reason: HOSPADM

## 2025-04-11 RX ORDER — KETOROLAC TROMETHAMINE 30 MG/ML
15 INJECTION, SOLUTION INTRAMUSCULAR; INTRAVENOUS EVERY 6 HOURS PRN
Status: DISCONTINUED | OUTPATIENT
Start: 2025-04-11 | End: 2025-04-12

## 2025-04-11 RX ORDER — KETOROLAC TROMETHAMINE 30 MG/ML
30 INJECTION, SOLUTION INTRAMUSCULAR; INTRAVENOUS EVERY 6 HOURS
Status: DISCONTINUED | OUTPATIENT
Start: 2025-04-11 | End: 2025-04-11

## 2025-04-11 RX ORDER — PHENYLEPHRINE HYDROCHLORIDE 10 MG/ML
INJECTION INTRAVENOUS
Status: DISCONTINUED | OUTPATIENT
Start: 2025-04-11 | End: 2025-04-11 | Stop reason: SDUPTHER

## 2025-04-11 RX ORDER — ACETAMINOPHEN 325 MG/1
650 TABLET ORAL EVERY 4 HOURS PRN
Status: DISCONTINUED | OUTPATIENT
Start: 2025-04-11 | End: 2025-04-12

## 2025-04-11 RX ORDER — CITRIC ACID/SODIUM CITRATE 334-500MG
30 SOLUTION, ORAL ORAL ONCE
Status: COMPLETED | OUTPATIENT
Start: 2025-04-11 | End: 2025-04-11

## 2025-04-11 RX ORDER — SODIUM CHLORIDE 0.9 % (FLUSH) 0.9 %
10 SYRINGE (ML) INJECTION PRN
Status: DISCONTINUED | OUTPATIENT
Start: 2025-04-11 | End: 2025-04-11

## 2025-04-11 RX ORDER — MORPHINE SULFATE 1 MG/ML
INJECTION, SOLUTION EPIDURAL; INTRATHECAL; INTRAVENOUS
Status: COMPLETED | OUTPATIENT
Start: 2025-04-11 | End: 2025-04-11

## 2025-04-11 RX ORDER — ONDANSETRON 2 MG/ML
4 INJECTION INTRAMUSCULAR; INTRAVENOUS EVERY 6 HOURS PRN
Status: DISCONTINUED | OUTPATIENT
Start: 2025-04-12 | End: 2025-04-11 | Stop reason: SDUPTHER

## 2025-04-11 RX ORDER — ACETAMINOPHEN 500 MG
1000 TABLET ORAL EVERY 8 HOURS SCHEDULED
Status: DISCONTINUED | OUTPATIENT
Start: 2025-04-11 | End: 2025-04-11

## 2025-04-11 RX ORDER — FENTANYL CITRATE 50 UG/ML
INJECTION, SOLUTION INTRAMUSCULAR; INTRAVENOUS
Status: DISCONTINUED | OUTPATIENT
Start: 2025-04-11 | End: 2025-04-11 | Stop reason: SDUPTHER

## 2025-04-11 RX ORDER — ONDANSETRON 2 MG/ML
4 INJECTION INTRAMUSCULAR; INTRAVENOUS EVERY 6 HOURS PRN
Status: DISCONTINUED | OUTPATIENT
Start: 2025-04-12 | End: 2025-04-15 | Stop reason: HOSPADM

## 2025-04-11 RX ORDER — SODIUM CHLORIDE, SODIUM LACTATE, POTASSIUM CHLORIDE, CALCIUM CHLORIDE 600; 310; 30; 20 MG/100ML; MG/100ML; MG/100ML; MG/100ML
INJECTION, SOLUTION INTRAVENOUS CONTINUOUS
Status: DISCONTINUED | OUTPATIENT
Start: 2025-04-11 | End: 2025-04-12

## 2025-04-11 RX ORDER — IBUPROFEN 800 MG/1
800 TABLET, FILM COATED ORAL EVERY 8 HOURS
Status: DISCONTINUED | OUTPATIENT
Start: 2025-04-13 | End: 2025-04-12

## 2025-04-11 RX ORDER — ONDANSETRON 2 MG/ML
4 INJECTION INTRAMUSCULAR; INTRAVENOUS EVERY 6 HOURS PRN
Status: DISCONTINUED | OUTPATIENT
Start: 2025-04-11 | End: 2025-04-12

## 2025-04-11 RX ORDER — EPHEDRINE SULFATE 5 MG/ML
INJECTION INTRAVENOUS
Status: DISCONTINUED | OUTPATIENT
Start: 2025-04-11 | End: 2025-04-11 | Stop reason: SDUPTHER

## 2025-04-11 RX ORDER — NALBUPHINE HYDROCHLORIDE 10 MG/ML
5 INJECTION INTRAMUSCULAR; INTRAVENOUS; SUBCUTANEOUS EVERY 4 HOURS PRN
Status: DISCONTINUED | OUTPATIENT
Start: 2025-04-11 | End: 2025-04-12

## 2025-04-11 RX ORDER — OXYCODONE HYDROCHLORIDE 5 MG/1
5 TABLET ORAL EVERY 4 HOURS PRN
Refills: 0 | Status: DISCONTINUED | OUTPATIENT
Start: 2025-04-11 | End: 2025-04-11

## 2025-04-11 RX ORDER — OXYCODONE HYDROCHLORIDE 5 MG/1
5 TABLET ORAL EVERY 4 HOURS PRN
Refills: 0 | Status: DISCONTINUED | OUTPATIENT
Start: 2025-04-12 | End: 2025-04-12

## 2025-04-11 RX ORDER — ONDANSETRON 4 MG/1
4 TABLET, ORALLY DISINTEGRATING ORAL EVERY 8 HOURS PRN
Status: DISCONTINUED | OUTPATIENT
Start: 2025-04-12 | End: 2025-04-15 | Stop reason: HOSPADM

## 2025-04-11 RX ORDER — DOCUSATE SODIUM 100 MG/1
100 CAPSULE, LIQUID FILLED ORAL 2 TIMES DAILY
Status: DISCONTINUED | OUTPATIENT
Start: 2025-04-12 | End: 2025-04-15 | Stop reason: HOSPADM

## 2025-04-11 RX ADMIN — NALBUPHINE HYDROCHLORIDE 5 MG: 10 INJECTION, SOLUTION INTRAMUSCULAR; INTRAVENOUS; SUBCUTANEOUS at 18:28

## 2025-04-11 RX ADMIN — PHENYLEPHRINE HYDROCHLORIDE 100 MCG: 10 INJECTION INTRAVENOUS at 08:07

## 2025-04-11 RX ADMIN — SODIUM CHLORIDE, PRESERVATIVE FREE 10 ML: 5 INJECTION INTRAVENOUS at 23:16

## 2025-04-11 RX ADMIN — EPHEDRINE SULFATE 10 MG: 5 INJECTION INTRAVENOUS at 08:16

## 2025-04-11 RX ADMIN — KETOROLAC TROMETHAMINE 30 MG: 30 INJECTION, SOLUTION INTRAMUSCULAR; INTRAVENOUS at 08:53

## 2025-04-11 RX ADMIN — BUPIVACAINE HYDROCHLORIDE IN DEXTROSE 13.5 MG: 7.5 INJECTION, SOLUTION SUBARACHNOID at 07:59

## 2025-04-11 RX ADMIN — PHENYLEPHRINE HYDROCHLORIDE 100 MCG: 10 INJECTION INTRAVENOUS at 08:11

## 2025-04-11 RX ADMIN — FENTANYL CITRATE 50 MCG: 50 INJECTION, SOLUTION INTRAMUSCULAR; INTRAVENOUS at 08:45

## 2025-04-11 RX ADMIN — SODIUM CITRATE AND CITRIC ACID MONOHYDRATE 30 ML: 2004; 3000 SOLUTION ORAL at 07:29

## 2025-04-11 RX ADMIN — NALBUPHINE HYDROCHLORIDE 5 MG: 10 INJECTION, SOLUTION INTRAMUSCULAR; INTRAVENOUS; SUBCUTANEOUS at 13:02

## 2025-04-11 RX ADMIN — ACETAMINOPHEN 650 MG: 325 TABLET, FILM COATED ORAL at 19:57

## 2025-04-11 RX ADMIN — FAMOTIDINE 20 MG: 10 INJECTION, SOLUTION INTRAVENOUS at 07:30

## 2025-04-11 RX ADMIN — EPHEDRINE SULFATE 10 MG: 5 INJECTION INTRAVENOUS at 08:20

## 2025-04-11 RX ADMIN — MORPHINE SULFATE 0.15 MG: 1 INJECTION, SOLUTION EPIDURAL; INTRATHECAL; INTRAVENOUS at 07:59

## 2025-04-11 RX ADMIN — PHENYLEPHRINE HYDROCHLORIDE 100 MCG: 10 INJECTION INTRAVENOUS at 08:14

## 2025-04-11 RX ADMIN — ONDANSETRON 4 MG: 2 INJECTION, SOLUTION INTRAMUSCULAR; INTRAVENOUS at 23:29

## 2025-04-11 RX ADMIN — KETOROLAC TROMETHAMINE 15 MG: 30 INJECTION, SOLUTION INTRAMUSCULAR at 15:50

## 2025-04-11 RX ADMIN — SODIUM CHLORIDE, SODIUM LACTATE, POTASSIUM CHLORIDE, AND CALCIUM CHLORIDE: 600; 310; 30; 20 INJECTION, SOLUTION INTRAVENOUS at 07:54

## 2025-04-11 RX ADMIN — ONDANSETRON 4 MG: 2 INJECTION, SOLUTION INTRAMUSCULAR; INTRAVENOUS at 07:30

## 2025-04-11 RX ADMIN — CEFAZOLIN 2000 MG: 10 INJECTION, POWDER, FOR SOLUTION INTRAVENOUS at 07:29

## 2025-04-11 RX ADMIN — FENTANYL CITRATE 50 MCG: 50 INJECTION, SOLUTION INTRAMUSCULAR; INTRAVENOUS at 08:50

## 2025-04-11 RX ADMIN — OXYTOCIN 87.3 MILLI-UNITS/MIN: 10 INJECTION, SOLUTION INTRAMUSCULAR; INTRAVENOUS at 09:42

## 2025-04-11 RX ADMIN — PHENYLEPHRINE HYDROCHLORIDE 100 MCG: 10 INJECTION INTRAVENOUS at 08:17

## 2025-04-11 RX ADMIN — KETOROLAC TROMETHAMINE 15 MG: 30 INJECTION, SOLUTION INTRAMUSCULAR at 23:13

## 2025-04-11 RX ADMIN — EPHEDRINE SULFATE 10 MG: 5 INJECTION INTRAVENOUS at 08:11

## 2025-04-11 RX ADMIN — SODIUM CHLORIDE, SODIUM LACTATE, POTASSIUM CHLORIDE, AND CALCIUM CHLORIDE: .6; .31; .03; .02 INJECTION, SOLUTION INTRAVENOUS at 18:40

## 2025-04-11 RX ADMIN — Medication 500 ML/HR: at 08:32

## 2025-04-11 ASSESSMENT — PAIN SCALES - GENERAL
PAINLEVEL_OUTOF10: 3
PAINLEVEL_OUTOF10: 0

## 2025-04-11 ASSESSMENT — PAIN DESCRIPTION - DESCRIPTORS
DESCRIPTORS: SORE
DESCRIPTORS: ACHING

## 2025-04-11 ASSESSMENT — PAIN DESCRIPTION - LOCATION
LOCATION: ABDOMEN
LOCATION: ABDOMEN;INCISION

## 2025-04-11 ASSESSMENT — PAIN DESCRIPTION - ORIENTATION: ORIENTATION: ANTERIOR;LOWER

## 2025-04-11 NOTE — H&P
Kim Hoffmann  [id number]      History and Physical  Subjective:     Patient is a 39 y.o.  female presents with mono di twins for primary csec and RRS. FGR in twin A See office notes on prenatal care.    Lab Results   Component Value Date/Time    ABORH A POSITIVE 2025 06:30 AM               Patient Active Problem List    Diagnosis Date Noted     delivery due to maternal disorder 2025    Fetal growth restriction antepartum 2025    At risk for cancer 2025    Anemia affecting pregnancy in third trimester 2025    Gastroesophageal reflux during pregnancy in third trimester, antepartum 2025    Single episode of elevated blood pressure 2025    Marginal insertion of umbilical cord affecting management of mother in third trimester 2024    Monochorionic diamniotic twin gestation in third trimester 2024    Multigravida of advanced maternal age in third trimester 10/28/2024    High risk pregnancy due to assisted reproductive technology in third trimester 10/28/2024     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 MAIN OR    TUBAL LIGATION        Prior to Admission Medications   Prescriptions Last Dose Informant Patient Reported? Taking?   Ascorbic Acid (VITAMIN C) 500 MG CAPS Not Taking  No No   Sig: Take with iron supplements   Patient not taking: Reported on 2025   Prenatal MV-Min-Fe Fum-FA-DHA (PRENATAL+DHA PO) 4/10/2025  Yes Yes   Sig: Take by mouth   acetaminophen (TYLENOL) 500 MG tablet Unknown  Yes No   Sig: Take 1 tablet by mouth

## 2025-04-11 NOTE — ANESTHESIA POSTPROCEDURE EVALUATION
Department of Anesthesiology  Postprocedure Note    Patient: Kim Hoffmann  MRN: [id number]  YOB: 1985  Date of evaluation: 2025    Procedure Summary       Date: 25 Room / [facility] [facility]  [facility]    Anesthesia Start: 754 Anesthesia Stop: 920    Procedure:  SECTION BILATERAL SALPINGECTOMY (Abdomen) Diagnosis:       S/P primary low transverse       (S/P primary low transverse  [Z98.891])    Surgeons: Cady Martínez MD Responsible Provider: Yesy Devries MD    Anesthesia Type: spinal ASA Status: 2            Anesthesia Type: No value filed.    Rosangela Phase I: Rosangela Score: 9    Rosangela Phase II:      Anesthesia Post Evaluation    Patient location during evaluation: PACU  Patient participation: complete - patient participated  Level of consciousness: awake and alert  Airway patency: patent  Nausea & Vomiting: no nausea  Cardiovascular status: hemodynamically stable  Respiratory status: acceptable  Hydration status: euvolemic  Pain management: adequate and satisfactory to patient        No notable events documented.

## 2025-04-11 NOTE — PROGRESS NOTES
Patient/caregivers speak Niuean  as their preferred language for their healthcare communication. For safe communication, use the AMN  carts or call:    Senior  Navigator Vania Sams at [phone] or   AMN phone services for [facility] at [phone]    General phone: 282-[facility] ( [phone])  Email: [email or web address]    Always document the use of interpreting services ('s ID number) in your clinical notes.    Our interpreters are available for team members working with limited  English proficient (LEP) patients remotely, via phone or video or in person (if needed for special cases).    When using family members to interpret, for the safety of the patient and protection of the communication of both our patient and [facility] staff the VRI or telephonic  should remain on the line to monitor that all communication is accurate and complete. The  should be instructed to notify [facility] staff immediately if there are any inaccuracies.         Thank you,        Vania MISTRY  Senior /Navigator

## 2025-04-11 NOTE — ANESTHESIA PROCEDURE NOTES
Spinal Block    Patient location during procedure: OR  End time: 4/11/2025 8:05 AM  Reason for block: primary anesthetic and at surgeon's request  Staffing  Performed: anesthesiologist   Anesthesiologist: Yesy Devries MD  Performed by: Yesy Devries MD  Authorized by: Yesy Devries MD    Spinal Block  Patient position: sitting  Prep: ChloraPrep  Patient monitoring: cardiac monitor, continuous pulse ox, frequent blood pressure checks and oxygen  Provider prep: mask and sterile gloves  Needle  Needle type: Pencan   Needle gauge: 25 G  Needle length: 3.5 in  Assessment  Swirl obtained: Yes  CSF: clear  Attempts: 1  Hemodynamics: stable  Preanesthetic Checklist  Completed: patient identified, IV checked, site marked, risks and benefits discussed, surgical/procedural consents, equipment checked, pre-op evaluation, timeout performed, anesthesia consent given, oxygen available and monitors applied/VS acknowledged

## 2025-04-11 NOTE — L&D DELIVERY NOTE
Apgars Assigned By: DR. MARTÍNEZ              Resuscitation    Method: Bulb Suction, Room Air, LMA              Measurements      Birth Weight: 2290 g   Birth Length: 45 cm     Head Circumference: 30.5 cm     Chest Circumference: 30.5 cm                INTRAUTERINE PREGNANCY  SECTION FULL OP NOTE      PATIENT: Kim Hoffmann  MRN: [id number]    2025     PREOPERATIVE DIAGNOSIS:  S/P primary low transverse  [Z98.891]    POSTOPERATIVE DIAGNOSIS:  KACIE with twins     ADDITIONAL DIAGNOSES: previous abdominoplasty, surgical absence of left tube    PROCEDURE: Low transverse  section, Removal of right tube    SURGEON:  Cady Martínez MD    ASSISTANT:  ALT      ANESTHESIA: Regional    EBL: see qbl cc    COMPLICATIONS: none    OPERATIVE PROCEDURE: Patient was placed on the operating room table in the supine position/left lateral tilt. Time out was done to confirm the operating procedure, surgeon, patient and site.  Once confirmed by the team, procedure was started. After having adequate regional anesthesia by spinal injection, the patient was prepped and draped in the usual fashion for abdominal surgery. A Pfannenstiel incision was made and carried down sharply through the skin, subcutaneous tissue, and fascia. Fascia was sharply dissected free from underlying rectus muscles. The peritoneum was sharply entered and extended vertically. DeLee bladder blade was then placed over the bladder. The visceroperitoneal reflection over the lower uterine segment was incised transversely and the bladder flap sharply and bluntly developed. The uterus was incised transversely, then extended bluntly, and the infant’s head was delivered with the  fundal pressure.  Fluid was clear. The second bag was located and ruptured and the infant's head was delivered with fundal pressure clear fluid noted.Cords were clamped and cut. Mouth and nose were suctioned clean. The infant was given to the NICU physician

## 2025-04-11 NOTE — ANESTHESIA PRE PROCEDURE
discussed with CRNA.          Post-op pain plan if not by surgeon: intrathecal narcotics            GANESH GIANG MD   4/11/2025

## 2025-04-12 ENCOUNTER — ANESTHESIA (OUTPATIENT)
Dept: MOTHER INFANT UNIT | Age: 40
End: 2025-04-12
Payer: MEDICAID

## 2025-04-12 ENCOUNTER — ANESTHESIA EVENT (OUTPATIENT)
Dept: MOTHER INFANT UNIT | Age: 40
End: 2025-04-12
Payer: MEDICAID

## 2025-04-12 LAB
ERYTHROCYTE [DISTWIDTH] IN BLOOD BY AUTOMATED COUNT: 17.2 % (ref 11.9–14.6)
HCT VFR BLD AUTO: 28.5 % (ref 35.8–46.3)
HGB BLD-MCNC: 9.6 G/DL (ref 11.7–15.4)
MCH RBC QN AUTO: 34.5 PG (ref 26.1–32.9)
MCHC RBC AUTO-ENTMCNC: 33.7 G/DL (ref 31.4–35)
MCV RBC AUTO: 102.5 FL (ref 82–102)
NRBC # BLD: 0 K/UL (ref 0–0.2)
PLATELET # BLD AUTO: 150 K/UL (ref 150–450)
PMV BLD AUTO: 11.3 FL (ref 9.4–12.3)
RBC # BLD AUTO: 2.78 M/UL (ref 4.05–5.2)
WBC # BLD AUTO: 10.7 K/UL (ref 4.3–11.1)

## 2025-04-12 PROCEDURE — 1100000000 HC RM PRIVATE

## 2025-04-12 PROCEDURE — 36415 COLL VENOUS BLD VENIPUNCTURE: CPT

## 2025-04-12 PROCEDURE — 6370000000 HC RX 637 (ALT 250 FOR IP): Performed by: ANESTHESIOLOGY

## 2025-04-12 PROCEDURE — 6360000002 HC RX W HCPCS: Performed by: ANESTHESIOLOGY

## 2025-04-12 PROCEDURE — 85027 COMPLETE CBC AUTOMATED: CPT

## 2025-04-12 PROCEDURE — 6370000000 HC RX 637 (ALT 250 FOR IP): Performed by: OBSTETRICS & GYNECOLOGY

## 2025-04-12 RX ORDER — IBUPROFEN 800 MG/1
800 TABLET, FILM COATED ORAL EVERY 8 HOURS
Status: DISCONTINUED | OUTPATIENT
Start: 2025-04-12 | End: 2025-04-15 | Stop reason: HOSPADM

## 2025-04-12 RX ORDER — OXYCODONE HYDROCHLORIDE 5 MG/1
5 TABLET ORAL EVERY 4 HOURS PRN
Refills: 0 | Status: DISCONTINUED | OUTPATIENT
Start: 2025-04-12 | End: 2025-04-15 | Stop reason: HOSPADM

## 2025-04-12 RX ORDER — OXYCODONE HYDROCHLORIDE 5 MG/1
10 TABLET ORAL EVERY 4 HOURS PRN
Refills: 0 | Status: DISCONTINUED | OUTPATIENT
Start: 2025-04-12 | End: 2025-04-15 | Stop reason: HOSPADM

## 2025-04-12 RX ADMIN — ACETAMINOPHEN 1000 MG: 500 TABLET, FILM COATED ORAL at 10:26

## 2025-04-12 RX ADMIN — ACETAMINOPHEN 1000 MG: 500 TABLET, FILM COATED ORAL at 19:38

## 2025-04-12 RX ADMIN — IBUPROFEN 800 MG: 800 TABLET, FILM COATED ORAL at 14:19

## 2025-04-12 RX ADMIN — ACETAMINOPHEN 650 MG: 325 TABLET, FILM COATED ORAL at 02:43

## 2025-04-12 RX ADMIN — DOCUSATE SODIUM 100 MG: 100 CAPSULE, LIQUID FILLED ORAL at 10:26

## 2025-04-12 RX ADMIN — IBUPROFEN 800 MG: 800 TABLET, FILM COATED ORAL at 22:29

## 2025-04-12 RX ADMIN — KETOROLAC TROMETHAMINE 15 MG: 30 INJECTION, SOLUTION INTRAMUSCULAR at 06:17

## 2025-04-12 RX ADMIN — DOCUSATE SODIUM 100 MG: 100 CAPSULE, LIQUID FILLED ORAL at 19:38

## 2025-04-12 ASSESSMENT — PAIN DESCRIPTION - ORIENTATION: ORIENTATION: ANTERIOR;LOWER

## 2025-04-12 ASSESSMENT — PAIN DESCRIPTION - DESCRIPTORS
DESCRIPTORS: SORE
DESCRIPTORS: DISCOMFORT

## 2025-04-12 ASSESSMENT — PAIN SCALES - GENERAL: PAINLEVEL_OUTOF10: 7

## 2025-04-12 ASSESSMENT — PAIN DESCRIPTION - LOCATION
LOCATION: ABDOMEN;INCISION
LOCATION: ABDOMEN;INCISION;BACK
LOCATION: INCISION;ABDOMEN

## 2025-04-12 NOTE — PROGRESS NOTES
Post-Partum Day Number 1 Progress Note    Patient doing well  without significant complaints.  Pain controlled on current medication.  Voiding without difficulty, normal lochia.  Pt seen with  screen.     Vitals:  BP (!) 111/90 Comment: RN notified  Pulse 72   Temp 97.9 °F (36.6 °C)   Resp 16   LMP 07/01/2024   SpO2 100%   Breastfeeding Unknown     Vital signs stable, afebrile.    Exam:  Patient without distress.  Heart rrr  Lungs cta b&s               Abdomen soft, fundus firm at level of umbilicus, nontender.  Wearing her compression garmet.      Incision clean, dry and intact.                Lower extremities are negative for swelling, cords or tenderness.   No results found for: \"LABABO\"     Lab Results   Component Value Date/Time    ABORH A POSITIVE 04/11/2025 06:30 AM     Lab Results   Component Value Date/Time    HGB 9.6 04/12/2025 06:17 AM     Starting hg 11.8    Assessment and Plan:  Patient appears to be having uncomplicated post-partum course.  Continue routine post-delivery care and maternal education.   Breastfeeding.  Twin delivery.  Acute blood loss anemia but asymptomatic. Routine care.  Discussed pumping / formula.  Babies in special care nursery.

## 2025-04-12 NOTE — PROGRESS NOTES
Shift assessment completed. Plan of care reviewed with  and patient verbalizes understanding. Questions encouraged and answered. Patient out of bed, ambulated to bathroom and ken care taught. Patent encouraged to call for needs or concerns.     Safety Teaching reviewed:   Hand hygiene prior to handling the infant.  Use of bulb syringe.  Bracelets with matching numbers are placed on mother and infant  An infant security tag  (Hugs) is placed on the infant's ankle and monitored  All OB nurses wear pink Employee badges - do not give your baby to anyone without proper identification.   Never leave the baby alone in the room.  The infant should be placed on their back to sleep.on a firm mattress. No toys should be placed in the crib. (safe sleep video offered to view)  Never shake the baby (video offered to view)  Infant fall prevention - do not sleep with the baby, and place the baby in the crib while ambulating.   Mother and Baby Care booklet given to Mother.

## 2025-04-12 NOTE — ANESTHESIA POST-OP
Patient is POD 1 s/p  and received neuraxial morphine for post-op pain control.  /87   Pulse 64   Temp 98.2 °F (36.8 °C) (Oral)   Resp 16   LMP 2024   SpO2 96%   Breastfeeding Unknown , airway patent, patient appropriately hydrated and appears euvolemic.  She reports minimal incisional pain.  Patient reports no pruritis and minimal nausea (now controlled).  Her lower extremities have returned to baseline neurologically.  She was satisfied with the anesthetic and reports no complications.  Continue current orders.  Discussion via daughter as .

## 2025-04-13 PROCEDURE — 1100000000 HC RM PRIVATE

## 2025-04-13 PROCEDURE — 6370000000 HC RX 637 (ALT 250 FOR IP): Performed by: OBSTETRICS & GYNECOLOGY

## 2025-04-13 RX ADMIN — IBUPROFEN 800 MG: 800 TABLET, FILM COATED ORAL at 06:56

## 2025-04-13 RX ADMIN — OXYCODONE 10 MG: 5 TABLET ORAL at 22:40

## 2025-04-13 RX ADMIN — IBUPROFEN 800 MG: 800 TABLET, FILM COATED ORAL at 14:29

## 2025-04-13 RX ADMIN — DOCUSATE SODIUM 100 MG: 100 CAPSULE, LIQUID FILLED ORAL at 10:46

## 2025-04-13 RX ADMIN — ACETAMINOPHEN 1000 MG: 500 TABLET, FILM COATED ORAL at 10:46

## 2025-04-13 RX ADMIN — ACETAMINOPHEN 1000 MG: 500 TABLET, FILM COATED ORAL at 02:58

## 2025-04-13 RX ADMIN — DOCUSATE SODIUM 100 MG: 100 CAPSULE, LIQUID FILLED ORAL at 21:12

## 2025-04-13 RX ADMIN — OXYCODONE 10 MG: 5 TABLET ORAL at 03:02

## 2025-04-13 RX ADMIN — ACETAMINOPHEN 1000 MG: 500 TABLET, FILM COATED ORAL at 21:12

## 2025-04-13 ASSESSMENT — PAIN SCALES - GENERAL
PAINLEVEL_OUTOF10: 7
PAINLEVEL_OUTOF10: 3
PAINLEVEL_OUTOF10: 8
PAINLEVEL_OUTOF10: 4

## 2025-04-13 ASSESSMENT — PAIN DESCRIPTION - LOCATION
LOCATION: INCISION
LOCATION: INCISION
LOCATION: ABDOMEN;BACK

## 2025-04-13 ASSESSMENT — PAIN DESCRIPTION - DESCRIPTORS
DESCRIPTORS: SHARP;STABBING
DESCRIPTORS: SORE

## 2025-04-13 NOTE — PROGRESS NOTES
Patient/caregivers speak Estonian  as their preferred language for their healthcare communication. For safe communication, use the AMN  carts or call:    Senior  Navigator Vania Sams at [phone] or   AMN phone services for [facility] at [phone]    General phone: [facility] ( [phone])  Email: [email or web address]    Always document the use of interpreting services ('s ID number) in your clinical notes.    Our interpreters are available for team members working with limited  English proficient (LEP) patients remotely, via phone or video or in person (if needed for special cases).    When using family members to interpret, for the safety of the patient and protection of the communication of both our patient and [facility] staff the VRI or telephonic  should remain on the line to monitor that all communication is accurate and complete. The  should be instructed to notify [facility] staff immediately if there are any inaccuracies.         Thank you,        Vania MISTRY  Senior /Navigator

## 2025-04-13 NOTE — PROGRESS NOTES
Post-Partum Day Number 2 Progress Note    Patient doing well  without significant complaints.  Pain controlled on current medication.  Voiding without difficulty, normal lochia.  Pt is seen with  screen.  One of the babies may come out of nicu today.      Vitals:  BP (!) 116/90   Pulse 67   Temp 98.4 °F (36.9 °C) (Oral)   Resp 16   LMP 07/01/2024   SpO2 97%   Breastfeeding Unknown     Vital signs stable, afebrile.    Exam:  Patient without distress.  Heart rrr  Lungs cta b&s               Abdomen soft, fundus firm at level of umbilicus, nontender.                     Lower extremities are negative for swelling, cords or tenderness.   No results found for: \"LABABO\"     Lab Results   Component Value Date/Time    ABORH A POSITIVE 04/11/2025 06:30 AM     Lab Results   Component Value Date/Time    HGB 9.6 04/12/2025 06:17 AM         Assessment and Plan:  Patient appears to be having uncomplicated post-partum course.  Continue routine post-delivery care and maternal education.  Babies in nicu for low blood sugar.  Acute blood loss anemia -aymptomtic.  Likely formula feeing.

## 2025-04-14 PROCEDURE — 1100000000 HC RM PRIVATE

## 2025-04-14 PROCEDURE — 6370000000 HC RX 637 (ALT 250 FOR IP): Performed by: OBSTETRICS & GYNECOLOGY

## 2025-04-14 RX ADMIN — OXYCODONE 10 MG: 5 TABLET ORAL at 05:22

## 2025-04-14 RX ADMIN — IBUPROFEN 800 MG: 800 TABLET, FILM COATED ORAL at 07:52

## 2025-04-14 RX ADMIN — OXYCODONE 10 MG: 5 TABLET ORAL at 15:41

## 2025-04-14 RX ADMIN — ACETAMINOPHEN 1000 MG: 500 TABLET, FILM COATED ORAL at 15:42

## 2025-04-14 RX ADMIN — DOCUSATE SODIUM 100 MG: 100 CAPSULE, LIQUID FILLED ORAL at 07:53

## 2025-04-14 RX ADMIN — ACETAMINOPHEN 1000 MG: 500 TABLET, FILM COATED ORAL at 05:21

## 2025-04-14 RX ADMIN — IBUPROFEN 800 MG: 800 TABLET, FILM COATED ORAL at 01:47

## 2025-04-14 RX ADMIN — DOCUSATE SODIUM 100 MG: 100 CAPSULE, LIQUID FILLED ORAL at 19:44

## 2025-04-14 RX ADMIN — IBUPROFEN 800 MG: 800 TABLET, FILM COATED ORAL at 19:43

## 2025-04-14 ASSESSMENT — PAIN DESCRIPTION - DESCRIPTORS
DESCRIPTORS: SORE
DESCRIPTORS: SHARP

## 2025-04-14 ASSESSMENT — PAIN SCALES - GENERAL
PAINLEVEL_OUTOF10: 7
PAINLEVEL_OUTOF10: 7

## 2025-04-14 ASSESSMENT — PAIN DESCRIPTION - ORIENTATION: ORIENTATION: ANTERIOR;LOWER

## 2025-04-14 ASSESSMENT — PAIN DESCRIPTION - LOCATION
LOCATION: INCISION
LOCATION: ABDOMEN

## 2025-04-14 NOTE — PROGRESS NOTES
Patient/caregivers speak Palauan  as their preferred language for their healthcare communication. For safe communication, use the AMN  carts or call:    Senior  Navigator Vania Sams at [phone] or   AMN phone services for [facility] at [phone]    General phone: 968-[facility] ( [phone])  Email: [email or web address]    Always document the use of interpreting services ('s ID number) in your clinical notes.    Our interpreters are available for team members working with limited  English proficient (LEP) patients remotely, via phone or video or in person (if needed for special cases).    When using family members to interpret, for the safety of the patient and protection of the communication of both our patient and [facility] staff the VRI or telephonic  should remain on the line to monitor that all communication is accurate and complete. The  should be instructed to notify [facility] staff immediately if there are any inaccuracies.         Thank you,        Vania MISTRY  Senior /Navigator

## 2025-04-14 NOTE — PROGRESS NOTES
Post-Partum Day Number 3 Progress Note    Patient doing well  without significant complaints.  Pain controlled on current medication.  Voiding without difficulty, normal lochia.  Babies in nicu.  Pt was seen with  screen.      Vitals:  BP (!) 126/94   Pulse 60   Temp 98.2 °F (36.8 °C) (Oral)   Resp 16   LMP 07/01/2024   SpO2 97%   Breastfeeding Unknown     Vital signs stable, afebrile.    Exam:  Patient without distress.  Heart rrr  Lungs cta b&s               Abdomen soft, fundus firm at level of umbilicus, nontender.      Incision clean, dry and intact.                Lower extremities are negative for swelling, cords or tenderness.   No results found for: \"LABABO\"     Lab Results   Component Value Date/Time    ABORH A POSITIVE 04/11/2025 06:30 AM     Lab Results   Component Value Date/Time    HGB 9.6 04/12/2025 06:17 AM         Assessment and Plan:  Patient appears to be having uncomplicated post-partum course.  Continue routine post-delivery care and maternal education.   Bottlefeeding.  Anticipate discharge home tomorrow.  Will keep for bonding.  Will send home on oral iron.

## 2025-04-14 NOTE — PROGRESS NOTES
Upon assessment, patient noted to have raised/swollen area on left side of her upper lip.  Over past few hours, area more swollen and now appears to be a lesion. Patient complains of some discomfort to her lip.  Ice pack given.  Patient denies a history of HSV.  Dr. Auguste notified.  Dr. Auguste states she will swab patient in the morning.  Patient updated.

## 2025-04-14 NOTE — CONSULTS
Clinical Ethics Consultation Note    Consulted by Dr. Arce for this 39 y.o. female who has requested an opportunistic salpingectomy.? The patient will have a c section. In the medical opinion of the provider, the patient’s uterus no longer meets its procreative end. Future pregnancies carry high risk for the mother and baby. The patient has been counseled regarding irreversible infertility. This procedure is consistent with the ERDs and the procedure is morally justified.? For questions or concerns regarding this consultation, please call Isaiah Culp at [phone] or via juan j Gonzales  Director of [place]

## 2025-04-15 VITALS
TEMPERATURE: 98.1 F | SYSTOLIC BLOOD PRESSURE: 119 MMHG | HEART RATE: 76 BPM | RESPIRATION RATE: 18 BRPM | DIASTOLIC BLOOD PRESSURE: 89 MMHG | OXYGEN SATURATION: 97 %

## 2025-04-15 PROCEDURE — 87255 GENET VIRUS ISOLATE HSV: CPT

## 2025-04-15 PROCEDURE — 6370000000 HC RX 637 (ALT 250 FOR IP): Performed by: STUDENT IN AN ORGANIZED HEALTH CARE EDUCATION/TRAINING PROGRAM

## 2025-04-15 PROCEDURE — 6370000000 HC RX 637 (ALT 250 FOR IP): Performed by: OBSTETRICS & GYNECOLOGY

## 2025-04-15 RX ORDER — OXYCODONE HYDROCHLORIDE 5 MG/1
5 TABLET ORAL EVERY 4 HOURS PRN
Qty: 20 TABLET | Refills: 0 | Status: SHIPPED | OUTPATIENT
Start: 2025-04-15 | End: 2025-04-22

## 2025-04-15 RX ORDER — IBUPROFEN 800 MG/1
800 TABLET, FILM COATED ORAL EVERY 8 HOURS PRN
Qty: 90 TABLET | Refills: 0 | Status: SHIPPED | OUTPATIENT
Start: 2025-04-15

## 2025-04-15 RX ORDER — VALACYCLOVIR HYDROCHLORIDE 1 G/1
1000 TABLET, FILM COATED ORAL DAILY
Qty: 5 TABLET | Refills: 0 | Status: SHIPPED | OUTPATIENT
Start: 2025-04-15 | End: 2025-04-20

## 2025-04-15 RX ORDER — VALACYCLOVIR HYDROCHLORIDE 500 MG/1
1000 TABLET, FILM COATED ORAL ONCE
Status: COMPLETED | OUTPATIENT
Start: 2025-04-15 | End: 2025-04-15

## 2025-04-15 RX ADMIN — DOCUSATE SODIUM 100 MG: 100 CAPSULE, LIQUID FILLED ORAL at 07:59

## 2025-04-15 RX ADMIN — ACETAMINOPHEN 1000 MG: 500 TABLET, FILM COATED ORAL at 00:06

## 2025-04-15 RX ADMIN — OXYCODONE 10 MG: 5 TABLET ORAL at 00:10

## 2025-04-15 RX ADMIN — VALACYCLOVIR HYDROCHLORIDE 1000 MG: 500 TABLET, FILM COATED ORAL at 09:28

## 2025-04-15 RX ADMIN — ACETAMINOPHEN 1000 MG: 500 TABLET, FILM COATED ORAL at 07:59

## 2025-04-15 RX ADMIN — IBUPROFEN 800 MG: 800 TABLET, FILM COATED ORAL at 04:29

## 2025-04-15 ASSESSMENT — PAIN DESCRIPTION - ORIENTATION: ORIENTATION: LOWER

## 2025-04-15 ASSESSMENT — PAIN DESCRIPTION - LOCATION: LOCATION: ABDOMEN

## 2025-04-15 ASSESSMENT — PAIN SCALES - GENERAL: PAINLEVEL_OUTOF10: 7

## 2025-04-15 ASSESSMENT — PAIN DESCRIPTION - DESCRIPTORS: DESCRIPTORS: SORE

## 2025-04-15 NOTE — PLAN OF CARE
and severity of pain and evaluate response   Implement non-pharmacological measures as appropriate and evaluate response   Consider cultural and social influences on pain and pain management   Notify Licensed Independent Practitioner if interventions unsuccessful or patient reports new pain  4/14/2025 0834 by Meera López RN  Outcome: Progressing  Flowsheets (Taken 4/14/2025 0740)  Verbalizes/displays adequate comfort level or baseline comfort level:   Encourage patient to monitor pain and request assistance   Assess pain using appropriate pain scale   Administer analgesics based on type and severity of pain and evaluate response   Implement non-pharmacological measures as appropriate and evaluate response   Consider cultural and social influences on pain and pain management   Notify Licensed Independent Practitioner if interventions unsuccessful or patient reports new pain     Problem: Infection - Adult  Goal: Absence of infection at discharge  4/14/2025 2022 by Nan Carter RN  Outcome: Progressing  4/14/2025 0834 by Meera López RN  Outcome: Progressing  Goal: Absence of infection during hospitalization  4/14/2025 2022 by Nan Carter RN  Outcome: Progressing  4/14/2025 0834 by Meera López RN  Outcome: Progressing  Goal: Absence of fever/infection during anticipated neutropenic period  4/14/2025 2022 by Nan Carter RN  Outcome: Progressing  4/14/2025 0834 by Meera López RN  Outcome: Progressing     Problem: Safety - Adult  Goal: Free from fall injury  4/14/2025 2022 by Nan Carter RN  Outcome: Progressing  4/14/2025 0834 by Meera López RN  Outcome: Progressing  Flowsheets (Taken 4/14/2025 0834)  Free From Fall Injury: Instruct family/caregiver on patient safety     Problem: Discharge Planning  Goal: Discharge to home or other facility with appropriate resources  4/14/2025 2022 by Nan Carter RN  Outcome: Progressing  4/14/2025 0834 by Meera López

## 2025-04-15 NOTE — DISCHARGE SUMMARY
Obstetrical Discharge Summary     Name: Kim Hoffmann MRN: [id number]  SSN: [id number]    YOB: 1985  Age: 39 y.o.  Sex: female      Allergies: Patient has no known allergies.    Admit Date: 2025    Discharge Date: 4/15/2025     Admitting Physician: Cady Martínez MD     Attending Physician:  Cady Martínez, *     * Admission Diagnoses:  delivery due to maternal disorder [O99.892]    * Discharge Diagnoses:   Information for the patient's :  Dayana Hoffmann A Kim [[id number]]   @[id number]@   Information for the patient's :  Dayana Hoffmann B Kim [[id number]]   @[id number]@     Additional Diagnoses:   Lab Results   Component Value Date/Time    ABORH A POSITIVE 2025 06:30 AM      Immunization History   Administered Date(s) Administered    Influenza, FLUARIX, FLULAVAL, FLUZONE (age 6 mo+) and AFLURIA, (age 3 y+), Quadv PF, 0.5mL 2022       * Procedures: Primary low transverse  section with right salpingectomy.  Procedure(s):   SECTION BILATERAL SALPINGECTOMY         * Discharge Condition: Good    * Hospital Course: Normal hospital course following the delivery.    * Disposition: Home    Discharge Medications:      Medication List        START taking these medications      ibuprofen 800 MG tablet  Commonly known as: ADVIL;MOTRIN  Take 1 tablet by mouth every 8 hours as needed for Pain     oxyCODONE 5 MG immediate release tablet  Commonly known as: ROXICODONE  Take 1 tablet by mouth every 4 hours as needed for Pain for up to 7 days. Max Daily Amount: 30 mg     valACYclovir 1 g tablet  Commonly known as: VALTREX  Take 1 tablet by mouth daily for 5 days            CONTINUE taking these medications      acetaminophen 500 MG tablet  Commonly known as: TYLENOL     ferrous sulfate 325 (65 Fe) MG EC tablet  Commonly known as: FE TABS 325  Take 1 tablet by mouth daily (with breakfast)     omeprazole 40 MG delayed release

## 2025-04-15 NOTE — PROGRESS NOTES
Patient/caregivers speak [place] citizen  as their preferred language for their healthcare communication. For safe communication, use the AMN  carts or call:    Senior  Navigator Vania Sams at [phone] or   AMN phone services for [facility] at [phone]    General phone: 912-[facility] ( [phone])  Email: [email or web address]    Always document the use of interpreting services ('s ID number) in your clinical notes.    Our interpreters are available for team members working with limited  English proficient (LEP) patients remotely, via phone or video or in person (if needed for special cases).    When using family members to interpret, for the safety of the patient and protection of the communication of both our patient and [facility] staff the VRI or telephonic  should remain on the line to monitor that all communication is accurate and complete. The  should be instructed to notify [facility] staff immediately if there are any inaccuracies.         Thank you,        Vania MISTRY  Senior /Navigator

## 2025-04-15 NOTE — PROGRESS NOTES
Post-op Day 4  Kim Hoffmann is a 39 y.o. female,       S- Tolerating diet well. Moderate cramps   Ambulating well, voiding well   Bleeding decreasing   Breast-feeding    Pt with lip swelling and ulcers that started yesterday afternoon. Pt denies h/o oral cold sores.    Vitals:    25 1221 25 1541 04/15/25 0121 04/15/25 0728   BP: (!) 128/99 116/83 107/85 119/89   Pulse: 68 81 77 76   Resp: 14 16 16 18   Temp: 98.1 °F (36.7 °C) 98.4 °F (36.9 °C) 98.1 °F (36.7 °C) 98.1 °F (36.7 °C)   TempSrc: Oral Oral Oral Oral   SpO2: 100% 97% 94% 97%     Lungs- non-labored respirations  CVS- regular rate, normal peripheral perfusion  Abd- Soft, appropriately tender  Incision- c/d/i, healing well  Fundus- Firm, appropriately tender   Lochia- Normal   extrem-  Non tender    Lab Results   Component Value Date    WBC 10.7 2025    HGB 9.6 (L) 2025    HCT 28.5 (L) 2025    .5 (H) 2025     2025       Imp- Stable POD 4 s/p PLTCS with RRS    Plan- Post op hgb appropriate  Routine post op care  Monitor for pain control    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 HSV swab obtained. Will start oral Valtrex.    DC home today  Follow up in office in 2 weeks      Isa Auguste MD

## 2025-04-15 NOTE — PROGRESS NOTES
Patient discharged from Mother Infant room. Discharge teaching complete. Patient verbalizes understanding. Questions encouraged and answered. Patient ambulated to Special Care Nursery to visit infant. Stable at discharge.

## 2025-04-15 NOTE — DISCHARGE INSTRUCTIONS
La atención de seguimiento es bharath parte clave de pizano tratamiento y seguridad. Asegúrese de hacer y acudir a todas las citas, y llame a pizano médico si está teniendo problemas. También es bharath buena idea saber los resultados de susan exámenes y mantener bharath lista de los medicamentos que nini.  ¿Cuándo debe pedir ayuda?  Comparta esta información con pizano petty, pizano jose o bharath amiga. Pueden ayudarla a prestar atención a las señales de advertencia.  Llame al 911  en cualquier momento que considere que necesita atención de urgencia. Por ejemplo, llame si:    Tiene pensamientos de herirse a sí misma, hacerle daño a pizano bebé o a otra persona.     Se desmayó (perdió el conocimiento).     Tiene dolor en el pecho, le falta el aire o tose wilner.     Tiene convulsiones.   Dónde obtener ayuda las 24 horas del día, los 7 días de la semana   Si usted o alguien que conoce habla de suicidio, autolesionarse, bharath crisis de akosua mental, bharath crisis por consumo de sustancias o cualquier otro tipo de malestar psíquico, obtenga ayuda de inmediato. Usted puede:    Marcar [phone] para llamar a la línea de prevención del suicidio y crisis.     Llamar al [phone] ([phone]).     Enviar un mensaje de texto que diga HOME al [id number] para acceder a la línea de mensajes de texto en casos de crisis.   Considere guardar estos números en pizano teléfono.  Visite [email or web address] para obtener más información o conversar en línea.  Llame al médico ahora mismo o busque atención médica inmediata si:    Tiene puntos de sutura flojos o se le abre la incisión.     Tiene señales de hemorragia (sangrado excesivo), aroldo:  Sangrado vaginal intenso. [place] significa que está empapando bharath o más toallas sanitarias en bharath hora. O expulsa coágulos de wilner más grandes que un huevo.  Se siente mareada o aturdida, o siente aroldo si se fuera a desmayar.  Se siente tan cansada o débil que no puede hacer susan actividades habituales.  Latidos cardíacos acelerados o

## 2025-04-16 NOTE — CARE COORDINATION
Chart reviewed - twin gestation; admitted to NICU (36w).  SW met with patient at the bedside in NICU.  Assessment completed with  [id number] (Jordan).    Patient was provided the opportunity to share about how she is coping with the twins being in the NICU.  Emotional support provided.      Patient is currently receiving WIC and states that she has all needed items to care for her girls.  Patient denies any difficulties with transportation to/from hospital.      Patient denies any history of postpartum depression/anxiety.  Patient given informational packet on  mood & anxiety disorders (resources/education) - in Bhutanese.    Family denies any additional needs from  at this time.  Family has 's contact information should any needs/questions arise.    Lorena Hooker, XENA-CP, [facility]  [facility]   [phone]

## 2025-04-17 ENCOUNTER — RESULTS FOLLOW-UP (OUTPATIENT)
Dept: OBGYN CLINIC | Age: 40
End: 2025-04-17

## 2025-04-17 LAB
HSV SPEC CULT: ABNORMAL
SPECIMEN SOURCE: ABNORMAL

## 2025-04-24 ENCOUNTER — POSTPARTUM VISIT (OUTPATIENT)
Dept: OBGYN CLINIC | Age: 40
End: 2025-04-24
Payer: MEDICAID

## 2025-04-24 VITALS
BODY MASS INDEX: 26.04 KG/M2 | HEIGHT: 67 IN | WEIGHT: 165.9 LBS | DIASTOLIC BLOOD PRESSURE: 80 MMHG | SYSTOLIC BLOOD PRESSURE: 118 MMHG

## 2025-04-24 NOTE — PROGRESS NOTES
Subjective: This 39 y.o. female presents today for a post-partum visit after vaginal delivery.  She is 2 weeks postpartum.  She denies fever, dyuria, heavy vaginal bleeding.  She is breast feeding.  She had a ceserean delivery with bilateral salpingectomy.  She is seen with the .  She is doing well.  She is not taking iron.  Hg was in the 9's after .      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       Past Surgical History:   Procedure Laterality Date    ABDOMINOPLASTY      BREAST REDUCTION SURGERY       SECTION N/A 2025     SECTION BILATERAL SALPINGECTOMY performed by Cady Martínez MD at [facility]    LAPAROSCOPY N/A 2023    LAPAROSCOPY DIAGNOSTIC, REMOVAL OF ECTOPIC PREGNANCY, LEFT SALPINGECTOMY performed by Palmer Mcguire MD at [facility] MAIN OR    TUBAL LIGATION         Social History     Socioeconomic History    Marital status:      Spouse name: Not on file    Number of children: Not on file    Years of education: Not on file    Highest education level: Not on file   Occupational History    Not on file   Tobacco Use    Smoking status: Former     Current packs/day: 0.20     Types: Cigarettes    Smokeless tobacco: Never   Vaping Use    Vaping status: Never Used   Substance and Sexual Activity    Alcohol use: Not Currently    Drug use: Not Currently    Sexual activity: Yes     Partners: Male     Birth control/protection: Surgical     Comment: tubal ligation   Other Topics Concern    Not on file   Social History Narrative    Not on file     Social Drivers of Health     Financial Resource Strain: Not on file   Food Insecurity: Not on file   Transportation Needs: Not on file   Physical Activity: Not on file   Stress: Not on file   Social Connections: Unknown (4/3/2022)    Received from

## 2025-05-12 RX ORDER — IBUPROFEN 800 MG/1
800 TABLET, FILM COATED ORAL EVERY 8 HOURS PRN
Qty: 90 TABLET | Refills: 0 | OUTPATIENT
Start: 2025-05-12

## 2025-05-20 NOTE — PROGRESS NOTES
6 Week Postpartum Visit-  PRESENT    Pt reports cycle started yesterday.    Name: Kim Hoffmann    Date: 2025    Age: 39 y.o.    OB History          7    Para   4    Term   3       1    AB   3    Living   5         SAB   2    IAB   0    Ectopic   1    Molar        Multiple   1    Live Births   5              /72   Ht 1.702 m (5' 7\")   Wt 72.9 kg (160 lb 11.2 oz)   LMP 2024        Delivered by Dr. Dash-Juanjose on 2025 by CS-LTranv.     Infant's Gender: females  Birth Weight: 4lb 13.3oz// 5lb 0.8oz Feeding: bottle feeding.   Desired Contraception: Right Salpingectomy    Last pap: 2023  Last pap results:  NILM; HPV Negative  Current Outpatient Medications   Medication Sig Dispense Refill    Prenatal MV-Min-Fe Fum-FA-DHA (PRENATAL+DHA PO) Take by mouth      ibuprofen (ADVIL;MOTRIN) 800 MG tablet Take 1 tablet by mouth every 8 hours as needed for Pain (Patient not taking: Reported on 2025) 90 tablet 0    omeprazole (PRILOSEC) 40 MG delayed release capsule Take 1 capsule by mouth every morning (before breakfast) (Patient not taking: Reported on 2025) 30 capsule 5    ferrous sulfate (FE TABS 325) 325 (65 Fe) MG EC tablet Take 1 tablet by mouth daily (with breakfast) (Patient not taking: Reported on 2025) 30 tablet 5    acetaminophen (TYLENOL) 500 MG tablet Take 1 tablet by mouth every 6 hours as needed for Pain (Patient not taking: Reported on 2025)       No current facility-administered medications for this visit.        Diagnosis Orders   1. Routine postpartum follow-up              Physical Exam  OBGyn Exam   Doing well  Moods bright  Lochia appropriate  Pfannenstiel incision c/d/I healing well  Uterus appropriate in size  Declines BC as she's had RRS  Bottle feeding--breasts without discomfort.  May resume all activity  RTC PRN      Supervising physician is Dr. Arce.  Greater than 50% of this 20 minute visit is spent in counseling to the

## 2025-05-22 ENCOUNTER — POSTPARTUM VISIT (OUTPATIENT)
Dept: OBGYN CLINIC | Age: 40
End: 2025-05-22

## 2025-05-22 VITALS
DIASTOLIC BLOOD PRESSURE: 72 MMHG | SYSTOLIC BLOOD PRESSURE: 106 MMHG | BODY MASS INDEX: 25.22 KG/M2 | HEIGHT: 67 IN | WEIGHT: 160.7 LBS

## (undated) DEVICE — AMD ANTIMICROBIAL GAUZE SPONGES,12 PLY USP TYPE VII, 0.2% POLYHEXAMETHYLENE BIGUANIDE HCI (PHMB): Brand: CURITY

## (undated) DEVICE — SUTURE PLN GUT SZ 2-0 L27IN ABSRB YELLOWISH TAN L40MM CT 853H

## (undated) DEVICE — SOLUTION IRRIG 1000ML H2O STRL BLT

## (undated) DEVICE — KIT URIN CATH L16FR BLLN 5ML INDWL STR TIP INF CTRL URIN

## (undated) DEVICE — SUTURE ABSORBABLE ANTIBACT 1-0 CT-1 24 IN STRATAFIX PDS + SXPP1A443

## (undated) DEVICE — APPLICATOR MEDICATED 26 CC SOLUTION HI LT ORNG CHLORAPREP

## (undated) DEVICE — STERILE POLYISOPRENE POWDER-FREE SURGICAL GLOVES: Brand: PROTEXIS

## (undated) DEVICE — AMD ANTIMICROBIAL NON-ADHERENT PAD,0.2% POLYHEXAMETHYLENE BIGUANIDE HCI (PHMB): Brand: TELFA

## (undated) DEVICE — SEALER ENDOSCP NANO COAT OPN DIV CRV L JAW LIGASURE IMPACT

## (undated) DEVICE — SUTURE CHROMIC GUT SZ 0 L27IN ABSRB BRN L36MM CT-1 1/2 CIR 812H

## (undated) DEVICE — KENDALL SCD EXPRESS SLEEVES, KNEE LENGTH, MEDIUM: Brand: KENDALL SCD

## (undated) DEVICE — SUTURE MONOCRYL SZ 4-0 L27IN ABSRB UD L19MM PS-2 1/2 CIR PRIM Y426H

## (undated) DEVICE — PENCIL ES L3M BTTN SWCH S STL HEX LOK BLDE ELECTRD HOLSTER

## (undated) DEVICE — SOLUTION IV 1000ML 0.9% SOD CHL

## (undated) DEVICE — Device: Brand: PORTEX

## (undated) DEVICE — PACK SURG CUST C SECT CDS SFE

## (undated) DEVICE — ELECTRODE PT RET AD L9FT HI MOIST COND ADH HYDRGEL CORDED

## (undated) DEVICE — SUTURE CHROMIC GUT SZ 1 L36IN ABSRB BRN L48MM CTX 1/2 CIR 905H

## (undated) DEVICE — TUBING, SUCTION, 1/4" X 10', STRAIGHT: Brand: MEDLINE